# Patient Record
Sex: MALE | Race: WHITE
[De-identification: names, ages, dates, MRNs, and addresses within clinical notes are randomized per-mention and may not be internally consistent; named-entity substitution may affect disease eponyms.]

---

## 2017-06-25 ENCOUNTER — RESULT REVIEW (OUTPATIENT)
Age: 57
End: 2017-06-25

## 2018-05-07 PROBLEM — Z00.00 ENCOUNTER FOR PREVENTIVE HEALTH EXAMINATION: Status: ACTIVE | Noted: 2018-05-07

## 2021-05-22 ENCOUNTER — APPOINTMENT (OUTPATIENT)
Dept: DISASTER EMERGENCY | Facility: CLINIC | Age: 61
End: 2021-05-22

## 2021-05-23 LAB — SARS-COV-2 N GENE NPH QL NAA+PROBE: NOT DETECTED

## 2021-07-10 ENCOUNTER — APPOINTMENT (OUTPATIENT)
Dept: CT IMAGING | Facility: CLINIC | Age: 61
End: 2021-07-10
Payer: COMMERCIAL

## 2021-07-10 ENCOUNTER — OUTPATIENT (OUTPATIENT)
Dept: OUTPATIENT SERVICES | Facility: HOSPITAL | Age: 61
LOS: 1 days | End: 2021-07-10
Payer: COMMERCIAL

## 2021-07-10 DIAGNOSIS — R10.9 UNSPECIFIED ABDOMINAL PAIN: ICD-10-CM

## 2021-07-10 PROCEDURE — 82565 ASSAY OF CREATININE: CPT

## 2021-07-10 PROCEDURE — 74177 CT ABD & PELVIS W/CONTRAST: CPT | Mod: 26

## 2021-07-10 PROCEDURE — 74177 CT ABD & PELVIS W/CONTRAST: CPT

## 2021-07-12 ENCOUNTER — APPOINTMENT (OUTPATIENT)
Dept: CT IMAGING | Facility: CLINIC | Age: 61
End: 2021-07-12

## 2022-07-29 ENCOUNTER — APPOINTMENT (OUTPATIENT)
Dept: ORTHOPEDIC SURGERY | Facility: CLINIC | Age: 62
End: 2022-07-29

## 2022-07-29 PROCEDURE — 99204 OFFICE O/P NEW MOD 45 MIN: CPT

## 2022-07-29 PROCEDURE — 73030 X-RAY EXAM OF SHOULDER: CPT | Mod: LT

## 2022-07-30 NOTE — HISTORY OF PRESENT ILLNESS
[de-identified] : LUIS FELIPE GIPSONBOSTON is a 62 year male being seen for initial visit L shoulder pain.

## 2022-07-30 NOTE — DISCUSSION/SUMMARY
[de-identified] : Narendra is a 62-year-old male whose had chronic left shoulder pain x1 year.  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  He has done conservative measures multiple cortisone injections.  He has subsequent proximal biceps rupture roughly 1 and half weeks ago.  We discussed operative versus nonoperative management he does have a full-thickness tear of his rotator cuff confirmed on MRI.  He is compensating moderately well.  He still has significant pain in his shoulder.  After thorough discussion of alternatives of care as well as advantages of edges complications patient would like to move forward with surgical invention.  I explained to him that a biceps tenodesis at this time may not improve his function significantly although he would like to consider attempting to try to fix it.  We also corrina rotator cuff repair which she is willing to move forward with.  All risks, benefits and alternatives to the proposed surgical procedure, left shoulder arthroscopy with rotator cuff repair, subacromial decompression, extensive debridement, open biceps tenodesis, were discussed in great detail with the patient. Risks include but are not limited to pain, bleeding, infection, stiffness, [default value], medical complications (including DVT, PE, MI), and risks of anesthesia. The patient expressed understanding and all questions were answered. The patient is electing to proceed, and will have the patient scheduled accordingly.

## 2022-07-30 NOTE — REASON FOR VISIT
Detail Level: Simple [Initial Visit] : an initial visit for [FreeTextEntry2] : L shoulder pain. Detail Level: Zone

## 2022-07-30 NOTE — PHYSICAL EXAM
[de-identified] : Physical Exam:\par General: Well appearing, no acute distress, A&O\par Neurologic: A&Ox3, No focal deficits\par Head: NCAT without abrasions, lacerations, or ecchymosis to head, face, or scalp\par Eyes: No scleral icterus, no gross abnormalities\par Respiratory: Equal chest wall expansion bilaterally, no accessory muscle use\par Lymphatic: No lymphadenopathy palpated\par Skin: Warm a\par Psychiatric: Normal mood and affect\par Left shoulder exam\par \par ·	Inspection: No malalignment, No defects\par ·	Skin: No masses, No lesions\par ·	Neck: Negative Spurlings, full ROM without pain\par ·	ROM: RIGHT Active FF to 180 abduction to 150 ER to 55, IR to L4, LEFT Active FF to 110, abduction to 90, ER to 15, IR to back pocket\par ·	Painful arc ROM: None\par ·	Tenderness: No bicipital tenderness, no tenderness to the greater tuberosity/RTC insertion, no anterior shoulder/lesser tuberosity tenderness\par ·	Strength: 5/5 ER, 5/5 IR in adduction, 5/5 supraspinatus testing, positive O'Briens test, deformity noted at biceps with Corwin sign\par ·	AC Joint: No ttp, no pain with cross arm testing\par ·	Biceps: Speed positive, Yergusons shoulder\par ·	Impingement test: Negative Sheehan, Negative Neer \par ·	Stability: Negative apprehension, negative anterior/posterior load and shift\par ·	Vasc: 2+ radial pulse\par ·	Neuro: AIN, PIN, Ulnar nerve in tact to motor\par ·	Sensation: In tact to light touch throughout\par \par  [de-identified] : 4 views of L shoulder were performed today and available for me to review. Results were discussed with the patient. They demonstrate no f/x, dislocation or other deformity.\par

## 2022-08-05 ENCOUNTER — OUTPATIENT (OUTPATIENT)
Dept: OUTPATIENT SERVICES | Facility: HOSPITAL | Age: 62
LOS: 1 days | End: 2022-08-05
Payer: COMMERCIAL

## 2022-08-05 VITALS
TEMPERATURE: 98 F | HEIGHT: 69.5 IN | RESPIRATION RATE: 16 BRPM | HEART RATE: 51 BPM | SYSTOLIC BLOOD PRESSURE: 111 MMHG | WEIGHT: 151.9 LBS | OXYGEN SATURATION: 100 % | DIASTOLIC BLOOD PRESSURE: 74 MMHG

## 2022-08-05 DIAGNOSIS — Z98.890 OTHER SPECIFIED POSTPROCEDURAL STATES: Chronic | ICD-10-CM

## 2022-08-05 DIAGNOSIS — Z01.818 ENCOUNTER FOR OTHER PREPROCEDURAL EXAMINATION: ICD-10-CM

## 2022-08-05 DIAGNOSIS — S46.219A STRAIN OF MUSCLE, FASCIA AND TENDON OF OTHER PARTS OF BICEPS, UNSPECIFIED ARM, INITIAL ENCOUNTER: ICD-10-CM

## 2022-08-05 LAB
ANION GAP SERPL CALC-SCNC: 4 MMOL/L — LOW (ref 5–17)
APTT BLD: 31.9 SEC — SIGNIFICANT CHANGE UP (ref 27.5–35.5)
BASOPHILS # BLD AUTO: 0.01 K/UL — SIGNIFICANT CHANGE UP (ref 0–0.2)
BASOPHILS NFR BLD AUTO: 0.2 % — SIGNIFICANT CHANGE UP (ref 0–2)
BUN SERPL-MCNC: 19 MG/DL — SIGNIFICANT CHANGE UP (ref 7–23)
CALCIUM SERPL-MCNC: 8.7 MG/DL — SIGNIFICANT CHANGE UP (ref 8.5–10.1)
CHLORIDE SERPL-SCNC: 108 MMOL/L — SIGNIFICANT CHANGE UP (ref 96–108)
CO2 SERPL-SCNC: 30 MMOL/L — SIGNIFICANT CHANGE UP (ref 22–31)
CREAT SERPL-MCNC: 0.87 MG/DL — SIGNIFICANT CHANGE UP (ref 0.5–1.3)
EGFR: 96 ML/MIN/1.73M2 — SIGNIFICANT CHANGE UP
EOSINOPHIL # BLD AUTO: 0.05 K/UL — SIGNIFICANT CHANGE UP (ref 0–0.5)
EOSINOPHIL NFR BLD AUTO: 1 % — SIGNIFICANT CHANGE UP (ref 0–6)
GLUCOSE SERPL-MCNC: 114 MG/DL — HIGH (ref 70–99)
HCT VFR BLD CALC: 44.3 % — SIGNIFICANT CHANGE UP (ref 39–50)
HGB BLD-MCNC: 15.2 G/DL — SIGNIFICANT CHANGE UP (ref 13–17)
IMM GRANULOCYTES NFR BLD AUTO: 0.2 % — SIGNIFICANT CHANGE UP (ref 0–1.5)
INR BLD: 1.1 RATIO — SIGNIFICANT CHANGE UP (ref 0.88–1.16)
LYMPHOCYTES # BLD AUTO: 1.41 K/UL — SIGNIFICANT CHANGE UP (ref 1–3.3)
LYMPHOCYTES # BLD AUTO: 28.3 % — SIGNIFICANT CHANGE UP (ref 13–44)
MCHC RBC-ENTMCNC: 31.8 PG — SIGNIFICANT CHANGE UP (ref 27–34)
MCHC RBC-ENTMCNC: 34.3 GM/DL — SIGNIFICANT CHANGE UP (ref 32–36)
MCV RBC AUTO: 92.7 FL — SIGNIFICANT CHANGE UP (ref 80–100)
MONOCYTES # BLD AUTO: 0.41 K/UL — SIGNIFICANT CHANGE UP (ref 0–0.9)
MONOCYTES NFR BLD AUTO: 8.2 % — SIGNIFICANT CHANGE UP (ref 2–14)
NEUTROPHILS # BLD AUTO: 3.1 K/UL — SIGNIFICANT CHANGE UP (ref 1.8–7.4)
NEUTROPHILS NFR BLD AUTO: 62.1 % — SIGNIFICANT CHANGE UP (ref 43–77)
PLATELET # BLD AUTO: 182 K/UL — SIGNIFICANT CHANGE UP (ref 150–400)
POTASSIUM SERPL-MCNC: 3.8 MMOL/L — SIGNIFICANT CHANGE UP (ref 3.5–5.3)
POTASSIUM SERPL-SCNC: 3.8 MMOL/L — SIGNIFICANT CHANGE UP (ref 3.5–5.3)
PROTHROM AB SERPL-ACNC: 12.8 SEC — SIGNIFICANT CHANGE UP (ref 10.5–13.4)
RBC # BLD: 4.78 M/UL — SIGNIFICANT CHANGE UP (ref 4.2–5.8)
RBC # FLD: 12 % — SIGNIFICANT CHANGE UP (ref 10.3–14.5)
SODIUM SERPL-SCNC: 142 MMOL/L — SIGNIFICANT CHANGE UP (ref 135–145)
WBC # BLD: 4.99 K/UL — SIGNIFICANT CHANGE UP (ref 3.8–10.5)
WBC # FLD AUTO: 4.99 K/UL — SIGNIFICANT CHANGE UP (ref 3.8–10.5)

## 2022-08-05 PROCEDURE — 85730 THROMBOPLASTIN TIME PARTIAL: CPT

## 2022-08-05 PROCEDURE — 85025 COMPLETE CBC W/AUTO DIFF WBC: CPT

## 2022-08-05 PROCEDURE — 36415 COLL VENOUS BLD VENIPUNCTURE: CPT

## 2022-08-05 PROCEDURE — 93010 ELECTROCARDIOGRAM REPORT: CPT

## 2022-08-05 PROCEDURE — 80048 BASIC METABOLIC PNL TOTAL CA: CPT

## 2022-08-05 PROCEDURE — 93005 ELECTROCARDIOGRAM TRACING: CPT

## 2022-08-05 PROCEDURE — 99214 OFFICE O/P EST MOD 30 MIN: CPT | Mod: 25

## 2022-08-05 PROCEDURE — 85610 PROTHROMBIN TIME: CPT

## 2022-08-05 NOTE — H&P PST ADULT - NSICDXPASTMEDICALHX_GEN_ALL_CORE_FT
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

## 2022-08-05 NOTE — H&P PST ADULT - ASSESSMENT
62 years old male present to PST prior to left shoulder arthroscopy rotator cuff repair, open bicep tenodesis, subacromial compression with Dr Son.    Plan   1. NPO as per ASU  2. Covid swab scheduled  3. Use E-Z sponge as directed  4. Drink a quart of extra  fluids the day before your surgery.  5. Medical optimization for surgery with Dr. Gage  6. CBC, BMP, PT/ INR and PTT sent to lab  7. EKG  done

## 2022-08-05 NOTE — H&P PST ADULT - HISTORY OF PRESENT ILLNESS
62 years old male with traumatic tear of left rotator cuff, tear of left bicep. Reports constant aching pain to left shoulder radiating to distal forearm. Sharp pain with lifting. Numbness and tingling to distal arm at times. Pain started "last fall". Seen by Dr. Son two weeks ago. MRI done by previous orthopedist. Planned left shoulder arthroscopy.

## 2022-08-05 NOTE — H&P PST ADULT - NSICDXFAMILYHX_GEN_ALL_CORE_FT
FAMILY HISTORY:  Mother  Still living? No  Family history of arthritis, Age at diagnosis: Age Unknown  Family history of depression, Age at diagnosis: Age Unknown

## 2022-08-05 NOTE — H&P PST ADULT - ATTENDING COMMENTS
Orthopedic Sports Attending:    Agree with above resident/PA note.  Note edited where necessary.      Patient seen and examined at bedside. Discussed the risks, complications, benefits and alternatives of care. Confirmed procedure and site. Patient fully understands and would like to proceed with surgery.    Celestino Son, DO  Orthopaedic Surgery

## 2022-08-05 NOTE — H&P PST ADULT - NSICDXPASTSURGICALHX_GEN_ALL_CORE_FT
PAST SURGICAL HISTORY:  History of incision and drainage     History of intestinal surgery as a child due to Hirshsprung's    History of shoulder surgery right tendon repair 2011

## 2022-08-06 DIAGNOSIS — S46.219A STRAIN OF MUSCLE, FASCIA AND TENDON OF OTHER PARTS OF BICEPS, UNSPECIFIED ARM, INITIAL ENCOUNTER: ICD-10-CM

## 2022-08-06 DIAGNOSIS — Z01.818 ENCOUNTER FOR OTHER PREPROCEDURAL EXAMINATION: ICD-10-CM

## 2022-08-08 PROBLEM — Q43.1 HIRSCHSPRUNG'S DISEASE: Chronic | Status: ACTIVE | Noted: 2022-08-05

## 2022-08-08 PROBLEM — H26.9 UNSPECIFIED CATARACT: Chronic | Status: ACTIVE | Noted: 2022-08-05

## 2022-08-08 PROBLEM — S46.219A STRAIN OF MUSCLE, FASCIA AND TENDON OF OTHER PARTS OF BICEPS, UNSPECIFIED ARM, INITIAL ENCOUNTER: Chronic | Status: ACTIVE | Noted: 2022-08-05

## 2022-08-08 PROBLEM — M51.26 OTHER INTERVERTEBRAL DISC DISPLACEMENT, LUMBAR REGION: Chronic | Status: ACTIVE | Noted: 2022-08-05

## 2022-08-08 PROBLEM — R00.2 PALPITATIONS: Chronic | Status: ACTIVE | Noted: 2022-08-05

## 2022-08-08 PROBLEM — S46.012A STRAIN OF MUSCLE(S) AND TENDON(S) OF THE ROTATOR CUFF OF LEFT SHOULDER, INITIAL ENCOUNTER: Chronic | Status: ACTIVE | Noted: 2022-08-05

## 2022-08-08 PROBLEM — M50.20 OTHER CERVICAL DISC DISPLACEMENT, UNSPECIFIED CERVICAL REGION: Chronic | Status: ACTIVE | Noted: 2022-08-05

## 2022-08-10 ENCOUNTER — OUTPATIENT (OUTPATIENT)
Dept: INPATIENT UNIT | Facility: HOSPITAL | Age: 62
LOS: 1 days | Discharge: HOME | End: 2022-08-10
Payer: COMMERCIAL

## 2022-08-10 ENCOUNTER — TRANSCRIPTION ENCOUNTER (OUTPATIENT)
Age: 62
End: 2022-08-10

## 2022-08-10 ENCOUNTER — APPOINTMENT (OUTPATIENT)
Dept: ORTHOPEDIC SURGERY | Facility: HOSPITAL | Age: 62
End: 2022-08-10

## 2022-08-10 ENCOUNTER — RESULT REVIEW (OUTPATIENT)
Age: 62
End: 2022-08-10

## 2022-08-10 VITALS
OXYGEN SATURATION: 100 % | RESPIRATION RATE: 16 BRPM | TEMPERATURE: 98 F | HEART RATE: 48 BPM | SYSTOLIC BLOOD PRESSURE: 125 MMHG | DIASTOLIC BLOOD PRESSURE: 76 MMHG

## 2022-08-10 VITALS
TEMPERATURE: 98 F | RESPIRATION RATE: 15 BRPM | DIASTOLIC BLOOD PRESSURE: 76 MMHG | SYSTOLIC BLOOD PRESSURE: 109 MMHG | HEIGHT: 69 IN | OXYGEN SATURATION: 99 % | HEART RATE: 47 BPM | WEIGHT: 151.9 LBS

## 2022-08-10 DIAGNOSIS — Z98.890 OTHER SPECIFIED POSTPROCEDURAL STATES: Chronic | ICD-10-CM

## 2022-08-10 DIAGNOSIS — S46.219A STRAIN OF MUSCLE, FASCIA AND TENDON OF OTHER PARTS OF BICEPS, UNSPECIFIED ARM, INITIAL ENCOUNTER: ICD-10-CM

## 2022-08-10 DIAGNOSIS — S46.012A STRAIN OF MUSCLE(S) AND TENDON(S) OF THE ROTATOR CUFF OF LEFT SHOULDER, INITIAL ENCOUNTER: ICD-10-CM

## 2022-08-10 PROCEDURE — 29827 SHO ARTHRS SRG RT8TR CUF RPR: CPT | Mod: LT,22

## 2022-08-10 PROCEDURE — 23430 REPAIR BICEPS TENDON: CPT | Mod: LT

## 2022-08-10 PROCEDURE — 29826 SHO ARTHRS SRG DECOMPRESSION: CPT | Mod: LT

## 2022-08-10 PROCEDURE — C1713: CPT

## 2022-08-10 PROCEDURE — 88304 TISSUE EXAM BY PATHOLOGIST: CPT

## 2022-08-10 PROCEDURE — C9399: CPT

## 2022-08-10 PROCEDURE — 88304 TISSUE EXAM BY PATHOLOGIST: CPT | Mod: 26

## 2022-08-10 RX ORDER — ONDANSETRON 8 MG/1
4 TABLET, FILM COATED ORAL ONCE
Refills: 0 | Status: DISCONTINUED | OUTPATIENT
Start: 2022-08-10 | End: 2022-08-10

## 2022-08-10 RX ORDER — IBUPROFEN 200 MG
1 TABLET ORAL
Qty: 0 | Refills: 0 | DISCHARGE

## 2022-08-10 RX ORDER — OXYCODONE HYDROCHLORIDE 5 MG/1
5 TABLET ORAL ONCE
Refills: 0 | Status: DISCONTINUED | OUTPATIENT
Start: 2022-08-10 | End: 2022-08-10

## 2022-08-10 RX ORDER — OXYCODONE HYDROCHLORIDE 5 MG/1
1 TABLET ORAL
Qty: 20 | Refills: 0
Start: 2022-08-10 | End: 2022-08-14

## 2022-08-10 RX ORDER — SODIUM CHLORIDE 9 MG/ML
1000 INJECTION, SOLUTION INTRAVENOUS
Refills: 0 | Status: DISCONTINUED | OUTPATIENT
Start: 2022-08-10 | End: 2022-08-10

## 2022-08-10 RX ORDER — FENTANYL CITRATE 50 UG/ML
50 INJECTION INTRAVENOUS
Refills: 0 | Status: DISCONTINUED | OUTPATIENT
Start: 2022-08-10 | End: 2022-08-10

## 2022-08-10 RX ORDER — ONDANSETRON 8 MG/1
1 TABLET, FILM COATED ORAL
Qty: 15 | Refills: 0
Start: 2022-08-10 | End: 2022-08-14

## 2022-08-10 RX ORDER — HYDROMORPHONE HYDROCHLORIDE 2 MG/ML
0.5 INJECTION INTRAMUSCULAR; INTRAVENOUS; SUBCUTANEOUS
Refills: 0 | Status: DISCONTINUED | OUTPATIENT
Start: 2022-08-10 | End: 2022-08-10

## 2022-08-10 RX ORDER — DOCUSATE SODIUM 100 MG
1 CAPSULE ORAL
Qty: 28 | Refills: 0
Start: 2022-08-10 | End: 2022-08-23

## 2022-08-10 RX ORDER — OXYCODONE HYDROCHLORIDE 5 MG/1
10 TABLET ORAL ONCE
Refills: 0 | Status: DISCONTINUED | OUTPATIENT
Start: 2022-08-10 | End: 2022-08-10

## 2022-08-10 RX ADMIN — OXYCODONE HYDROCHLORIDE 10 MILLIGRAM(S): 5 TABLET ORAL at 15:37

## 2022-08-10 RX ADMIN — FENTANYL CITRATE 50 MICROGRAM(S): 50 INJECTION INTRAVENOUS at 15:54

## 2022-08-10 RX ADMIN — HYDROMORPHONE HYDROCHLORIDE 0.5 MILLIGRAM(S): 2 INJECTION INTRAMUSCULAR; INTRAVENOUS; SUBCUTANEOUS at 15:50

## 2022-08-10 RX ADMIN — FENTANYL CITRATE 50 MICROGRAM(S): 50 INJECTION INTRAVENOUS at 15:36

## 2022-08-10 RX ADMIN — HYDROMORPHONE HYDROCHLORIDE 0.5 MILLIGRAM(S): 2 INJECTION INTRAMUSCULAR; INTRAVENOUS; SUBCUTANEOUS at 15:54

## 2022-08-10 RX ADMIN — SODIUM CHLORIDE 75 MILLILITER(S): 9 INJECTION, SOLUTION INTRAVENOUS at 15:54

## 2022-08-10 RX ADMIN — OXYCODONE HYDROCHLORIDE 10 MILLIGRAM(S): 5 TABLET ORAL at 15:53

## 2022-08-10 NOTE — ASU DISCHARGE PLAN (ADULT/PEDIATRIC) - CARE PROVIDER_API CALL
Celestino Son (DO)  [address], [address]  [place] [address]  Phone: [phone]  Fax: [phone]  Follow Up Time:

## 2022-08-10 NOTE — ASU DISCHARGE PLAN (ADULT/PEDIATRIC) - NS MD DC FALL RISK RISK
For information on Fall & Injury Prevention, visit: [email or web address] OR  [email or web address] OR  https://www.cdc.gov/steadi/patient.html

## 2022-08-10 NOTE — ASU PATIENT PROFILE, ADULT - FALL HARM RISK - UNIVERSAL INTERVENTIONS
Bed in lowest position, wheels locked, appropriate side rails in place/Call bell, personal items and telephone in reach/Instruct patient to call for assistance before getting out of bed or chair/Non-slip footwear when patient is out of bed/[place] to call system/Physically safe environment - no spills, clutter or unnecessary equipment/Purposeful Proactive Rounding/Room/bathroom lighting operational, light cord in reach

## 2022-08-10 NOTE — BRIEF OPERATIVE NOTE - OPERATION/FINDINGS
left shoulder arthroscopic rotator cuff repair, subscapularis repair, subacromial decompression, open subpectoral biceps tenodesis, extensive arthroscopic debridement

## 2022-08-12 DIAGNOSIS — S46.012A STRAIN OF MUSCLE(S) AND TENDON(S) OF THE ROTATOR CUFF OF LEFT SHOULDER, INITIAL ENCOUNTER: ICD-10-CM
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
## 2022-08-12 DIAGNOSIS — S46.112A STRAIN OF MUSCLE, FASCIA AND TENDON OF LONG HEAD OF BICEPS, LEFT ARM, INITIAL ENCOUNTER: ICD-10-CM

## 2022-08-23 ENCOUNTER — APPOINTMENT (OUTPATIENT)
Dept: ORTHOPEDIC SURGERY | Facility: CLINIC | Age: 62
End: 2022-08-23
Payer: COMMERCIAL

## 2022-08-23 PROCEDURE — 73030 X-RAY EXAM OF SHOULDER: CPT | Mod: LT

## 2022-08-23 PROCEDURE — 99024 POSTOP FOLLOW-UP VISIT: CPT

## 2022-08-24 NOTE — ADDENDUM
[FreeTextEntry1] : Documented by Yina Charles acting as a scribe for Dr. Son and Jeff Choi PA-C on 08/23/2022. \par \par All medical record entries made by the Scribe were at my, Dr. Son, and Jeff Choi's, direction and\par personally dictated by me on 08/23/2022. I have reviewed the chart and agree that the record\par accurately reflects my personal performance of the history, physical exam, procedure and imaging.

## 2022-08-24 NOTE — HISTORY OF PRESENT ILLNESS
[___ Days Post Op] : post op day #[unfilled] [Doing Well] : is doing well [Excellent Pain Control] : has excellent pain control [No Sign of Infection] : is showing no signs of infection [2] : the patient reports pain that is 2/10 in severity [de-identified] : Procedure: Left shoulder arthroscopic rotator cuff repair, subscapularis repair, subacromial decompression, open subpectoral biceps \par tenodesis, extensive arthroscopic debridement \par DOS: 08/10/2022 [de-identified] : Patient presents 13 days status post left shoulder arthroscopic rotator cuff repair, subscapularis repair, subacromial decompression, open subpectoral biceps \par tenodesis, extensive arthroscopic debridement. He denies fever or chills, redness around or near the incision site(s), numbness/tingling. He denies nausea/ vomiting and admits to their appetite since their surgery being back to normal. Normal bowel habits at this time. Patient has not yet started physical therapy. Patient presents today in protective sling with pillow. Patient since their surgery has utilized tylenol as their primary pain control with relief in their symptoms. He no longer requires narcotics for pain control. He complains of occasional difficulty sleeping secondary to the pain. However, he reports symptoms have been improving since the surgery.  [de-identified] : Incisions are clean, dry and intact. No surrounding erythema. No drainage. Wound appears to be healing well. ROM not tested due to nature of surgery. Motor and sensation are intact distally. He has full range of motion of all fingers with capillary refill of <2 seconds throughout. He has good nerve function and median nerve, ulnar, radial, PIN, AIN. He has no sensory deficits over the C5-T1 nerve roots. Radial pulses 2+. Able to make an A-OK sign and thumbs up sign: able to flex/extend thumb, able to cross middle over index finger. \par \par Full ROM elbow, wrist, hand  [de-identified] : 2 view xrays of pt Left (L) shoulder were performed today and available for me to review. They demonstrate adequate position of suture buttons to bone. No fracture. No dislocation. No other deformities. Humeral head not high riding, adequate position of humeral head in glenoid.  [de-identified] : Patient was given prescription of formal physical therapy that he will perform 2x/wk for 6-8 wks. He was advised to continue nonweightbearing with the left UE. Patient will follow up in 4 wks for repeat clinical assessment. All questions were answered and the patient verbalized understanding. The patient is in agreement with this treatment plan.

## 2022-09-21 ENCOUNTER — APPOINTMENT (OUTPATIENT)
Dept: ORTHOPEDIC SURGERY | Facility: CLINIC | Age: 62
End: 2022-09-21

## 2022-09-21 PROCEDURE — 99024 POSTOP FOLLOW-UP VISIT: CPT

## 2022-09-21 NOTE — HISTORY OF PRESENT ILLNESS
[___ Weeks Post Op] : [unfilled] weeks post op [1] : the patient reports pain that is 1/10 in severity [Clean/Dry/Intact] : clean, dry and intact [Neuro Intact] : an unremarkable neurological exam [Vascular Intact] : ~T peripheral vascular exam normal [Doing Well] : is doing well [Excellent Pain Control] : has excellent pain control [No Sign of Infection] : is showing no signs of infection [de-identified] : Procedure: Left shoulder arthroscopic rotator cuff repair, subscapularis repair, subacromial decompression, open subpectoral biceps \par tenodesis, extensive arthroscopic debridement \par DOS: 08/10/2022 [de-identified] : Patient presents 6 weeks status post left shoulder arthroscopic rotator cuff repair, subscapularis repair, subacromial decompression, open subpectoral biceps \par tenodesis, extensive arthroscopic debridement, 6 weeks ago. Patient presents today out of sling. He reports he is feeling well, and his sleep has been improving. He states he has been doing pendulums.  He attended 1 session of physical therapy and has a follow-up appointment next week.  [de-identified] : Incisions are clean, dry and intact. No surrounding erythema. No drainage. Wound appears to be healing well.  The patient has active forward flexion to 125 degrees external rotation to 65 degrees and internal rotation to a mid lumbar level.  He has full elbow wrist and hand motion and 2+ capillary refill.  Sensations intact distally. [de-identified] : No new imaging performed today. [de-identified] : Patient was given prescription of formal physical therapy that he will perform 2x/wk for 6-8 wks. He was advised to form active and passive range of motion.  He will avoid strengthening.  We discussed the natural history and biology behind rotator cuff repairs as well as the potential for failure requiring revision.  He will follow-up in 6 weeks.  All questions were answered.

## 2022-11-02 ENCOUNTER — APPOINTMENT (OUTPATIENT)
Dept: ORTHOPEDIC SURGERY | Facility: CLINIC | Age: 62
End: 2022-11-02

## 2022-11-02 PROCEDURE — 99024 POSTOP FOLLOW-UP VISIT: CPT

## 2022-11-02 NOTE — HISTORY OF PRESENT ILLNESS
[___ Months Post Op] : [unfilled] months post op [1] : the patient reports pain that is 1/10 in severity [Clean/Dry/Intact] : clean, dry and intact [Neuro Intact] : an unremarkable neurological exam [Vascular Intact] : ~T peripheral vascular exam normal [Doing Well] : is doing well [Excellent Pain Control] : has excellent pain control [No Sign of Infection] : is showing no signs of infection [de-identified] : Procedure: Left shoulder arthroscopic rotator cuff repair, subscapularis repair, subacromial decompression, open subpectoral biceps \par tenodesis, extensive arthroscopic debridement \par DOS: 08/10/2022 [de-identified] : Patient presents 12 weeks status post left shoulder arthroscopic rotator cuff repair, subscapularis repair, subacromial decompression, open subpectoral biceps \par tenodesis, extensive arthroscopic debridement,  weeks ago. Patient presents today out of sling. He reports he is feeling well, and his sleep has been improving. He states he has been doing pendulums.  He is no longer attending PT as he felt it his neck and lower back. [de-identified] : Incisions are clean, dry and intact. No surrounding erythema. No drainage. Wound appears to be healing well.  The patient has active forward flexion to 165 degrees external rotation to 65 degrees and internal rotation to a mid lumbar level.  He has full elbow wrist and hand motion and 2+ capillary refill.  Sensations intact distally. [de-identified] : No new imaging performed today. [de-identified] : Patient was given prescription of formal physical therapy.  He can initiate the strengthening phase.  I cautioned him against doing too much too soon.  He can apply ice and moist heat.  He is unsure if he will return to physical therapy.  I instructed him on a home program including slow progression of strengthening.  He will follow-up in 2 to 3 months.  All questions were answered.

## 2023-01-09 ENCOUNTER — APPOINTMENT (OUTPATIENT)
Dept: ORTHOPEDIC SURGERY | Facility: CLINIC | Age: 63
End: 2023-01-09

## 2023-03-09 ENCOUNTER — NON-APPOINTMENT (OUTPATIENT)
Age: 63
End: 2023-03-09

## 2023-03-09 ENCOUNTER — APPOINTMENT (OUTPATIENT)
Dept: ELECTROPHYSIOLOGY | Facility: CLINIC | Age: 63
End: 2023-03-09
Payer: COMMERCIAL

## 2023-03-09 VITALS
HEART RATE: 55 BPM | OXYGEN SATURATION: 96 % | WEIGHT: 161 LBS | TEMPERATURE: 99.1 F | DIASTOLIC BLOOD PRESSURE: 68 MMHG | SYSTOLIC BLOOD PRESSURE: 116 MMHG

## 2023-03-09 DIAGNOSIS — Z78.9 OTHER SPECIFIED HEALTH STATUS: ICD-10-CM

## 2023-03-09 DIAGNOSIS — Z82.49 FAMILY HISTORY OF ISCHEMIC HEART DISEASE AND OTHER DISEASES OF THE CIRCULATORY SYSTEM: ICD-10-CM

## 2023-03-09 DIAGNOSIS — Q43.1 HIRSCHSPRUNG'S DISEASE: ICD-10-CM

## 2023-03-09 DIAGNOSIS — S46.219A STRAIN OF MUSCLE, FASCIA AND TENDON OF OTHER PARTS OF BICEPS, UNSPECIFIED ARM, INITIAL ENCOUNTER: ICD-10-CM

## 2023-03-09 DIAGNOSIS — S46.012A STRAIN OF MUSCLE(S) AND TENDON(S) OF THE ROTATOR CUFF OF LEFT SHOULDER, INITIAL ENCOUNTER: ICD-10-CM

## 2023-03-09 DIAGNOSIS — Z01.818 ENCOUNTER FOR OTHER PREPROCEDURAL EXAMINATION: ICD-10-CM

## 2023-03-09 PROCEDURE — 99204 OFFICE O/P NEW MOD 45 MIN: CPT | Mod: 25

## 2023-03-09 PROCEDURE — 93000 ELECTROCARDIOGRAM COMPLETE: CPT

## 2023-03-09 RX ORDER — RIVAROXABAN 20 MG/1
20 TABLET, FILM COATED ORAL
Refills: 0 | Status: ACTIVE | COMMUNITY

## 2023-03-09 NOTE — HISTORY OF PRESENT ILLNESS
[FreeTextEntry1] : 62 year old gentleman presenting for evaluation of paroxysmal atrial fibrillation.  He has had episodes of palpitaiton for the last few years, that feel irregular and sometimes rapid. He recently had  an episodes that lasted more than 1 day. During workup for shoulder surgery  last year he was noted to have frequent ectopy.  \par \par  A recent event monitor 12/21/22- 1/4/23 revealed 9% AF burden with episodes of rapid rates up to 130s, and longest AF episodes lasting 8 hours (some episodes c.w atypical flutter), and two episodes of NSVT up to 10 beats.  \par \par He has otherwise had sinus bradycardia, which has limited medical therapy.  \par \par He has been started on Xarelto, which he is tolerating. \par \par He did see Dr Deng and ablation was recommended, but he is seeking a second opinion. He discussed with his daughter who is an NP and initially recommended he go to [facility], but he prefers to stay local.  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 He denies limitations with exertion.  \par \par  He does have 1-2 alcoholic drinks about 4x per week.  \par \par He denies history of CAD, CHF, syncope, or dyspnea. He does not snore, but does occasionally wake up sweating.  \par \par ECG today reveals sinus rhythm 46 bpm, with early repolarization pattern in the precordial leads, LA enlargement, and possible anteroseptal MI. Recent stress test and TTE have been unremarkable.

## 2023-03-09 NOTE — PHYSICAL EXAM
[Well Developed] : well developed [Well Nourished] : well nourished [No Acute Distress] : no acute distress [Normal Conjunctiva] : normal conjunctiva [Normal Venous Pressure] : normal venous pressure [No Murmur] : no murmur [No Rub] : no rub [Clear Lung Fields] : clear lung fields [Good Air Entry] : good air entry [No Respiratory Distress] : no respiratory distress  [Soft] : abdomen soft [Normal Gait] : normal gait [No Edema] : no edema [No Cyanosis] : no cyanosis [No Clubbing] : no clubbing [No Rash] : no rash [Moves all extremities] : moves all extremities [No Focal Deficits] : no focal deficits [Normal Speech] : normal speech [Alert and Oriented] : alert and oriented [de-identified] : regular bradycardia

## 2023-03-09 NOTE — CARDIOLOGY SUMMARY
[de-identified] : 3/9/23 sinus bradycardia 46 bpm, with early repolarization pattern in the precordial leads, LA enlargement, and possible anteroseptal MI. [de-identified] : Stress 1/2023 normal perfusion, no ischemia, occasional PACs and sinus bradycardia  [de-identified] :  \par \par TTE 12/2022 LVEF 60%,

## 2023-03-09 NOTE — DISCUSSION/SUMMARY
[EKG obtained to assist in diagnosis and management of assessed problem(s)] : EKG obtained to assist in diagnosis and management of assessed problem(s) [FreeTextEntry1] :  62 year old gentleman presenting for evaluation of paroxysmal atrial fibrillation. He has had symptomatic paroxysmal AF, with high burden of pAF and atrial flutter on recent event monitor (9%, episodes over 8hours). His symptoms are currently mild-moderate, but he has noted bother and long lasting episodes in the past. He has baseline sinus bradycardia, with HR 46 bpm today at rest, and is unlikely to tolerate medical therapy well. We did discuss AF in detail, associated risks and morbidities, and management options. I do agree that AF ablation is a good option for him to prevent further arrhythmia progression, and treat current symptoms. We discussed the risks nad benefits of AF ablation in detail, including procedure related risks such as bleeding, vascular injury, cardiac perforation, stroke and esophageal injury. He expressed understanding, and does want to proceed with ablation. I did explain that he would be in good hands with Dr Deng if he chooses to proceed with ablation with him. He is interested in planning ablation around his work schedule, which gets very busy in the spring.  \par \par -Ablation of paroxysmal atrial fibrillation and atrial flutter. T/c COSME pre ablation \par \par -continue Xarelto. Can take through day prior to procedure. Will reevaluate need for long-term anticoagulation in the future given his low thromboembolic risk profile. \par \par -episodes of NSVT noted on MCOT- would consider beta blocker but unable to take this due to bradycardia. Normal LV function noted with no ischemia on stress.  Will continue to monitor.

## 2023-03-09 NOTE — REVIEW OF SYSTEMS
[Palpitations] : palpitations [Under Stress] : under stress [Negative] : Heme/Lymph [SOB] : no shortness of breath [Dyspnea on exertion] : not dyspnea during exertion [Chest Discomfort] : no chest discomfort [Lower Ext Edema] : no extremity edema [Leg Claudication] : no intermittent leg claudication [Syncope] : no syncope [Joint Pain] : no joint pain [Myalgia] : no myalgia [Dizziness] : no dizziness [Convulsions] : no convulsions

## 2023-06-30 ENCOUNTER — OUTPATIENT (OUTPATIENT)
Dept: OUTPATIENT SERVICES | Facility: HOSPITAL | Age: 63
LOS: 1 days | End: 2023-06-30
Payer: MEDICARE

## 2023-06-30 VITALS
WEIGHT: 156.53 LBS | HEART RATE: 59 BPM | HEIGHT: 70 IN | SYSTOLIC BLOOD PRESSURE: 102 MMHG | RESPIRATION RATE: 18 BRPM | TEMPERATURE: 97 F | DIASTOLIC BLOOD PRESSURE: 78 MMHG | OXYGEN SATURATION: 96 %

## 2023-06-30 DIAGNOSIS — I48.0 PAROXYSMAL ATRIAL FIBRILLATION: ICD-10-CM

## 2023-06-30 DIAGNOSIS — Z98.890 OTHER SPECIFIED POSTPROCEDURAL STATES: Chronic | ICD-10-CM

## 2023-06-30 DIAGNOSIS — H26.9 UNSPECIFIED CATARACT: Chronic | ICD-10-CM

## 2023-06-30 DIAGNOSIS — Z01.818 ENCOUNTER FOR OTHER PREPROCEDURAL EXAMINATION: ICD-10-CM

## 2023-06-30 DIAGNOSIS — Z29.9 ENCOUNTER FOR PROPHYLACTIC MEASURES, UNSPECIFIED: ICD-10-CM

## 2023-06-30 LAB
ANION GAP SERPL CALC-SCNC: 10 MMOL/L — SIGNIFICANT CHANGE UP (ref 5–17)
APTT BLD: 33.7 SEC — SIGNIFICANT CHANGE UP (ref 27.5–35.5)
BASOPHILS # BLD AUTO: 0.02 K/UL — SIGNIFICANT CHANGE UP (ref 0–0.2)
BASOPHILS NFR BLD AUTO: 0.5 % — SIGNIFICANT CHANGE UP (ref 0–2)
BLD GP AB SCN SERPL QL: SIGNIFICANT CHANGE UP
BUN SERPL-MCNC: 19.4 MG/DL — SIGNIFICANT CHANGE UP (ref 8–20)
CALCIUM SERPL-MCNC: 8.5 MG/DL — SIGNIFICANT CHANGE UP (ref 8.4–10.5)
CHLORIDE SERPL-SCNC: 103 MMOL/L — SIGNIFICANT CHANGE UP (ref 96–108)
CO2 SERPL-SCNC: 25 MMOL/L — SIGNIFICANT CHANGE UP (ref 22–29)
CREAT SERPL-MCNC: 0.66 MG/DL — SIGNIFICANT CHANGE UP (ref 0.5–1.3)
EGFR: 104 ML/MIN/1.73M2 — SIGNIFICANT CHANGE UP
EOSINOPHIL # BLD AUTO: 0.05 K/UL — SIGNIFICANT CHANGE UP (ref 0–0.5)
EOSINOPHIL NFR BLD AUTO: 1.2 % — SIGNIFICANT CHANGE UP (ref 0–6)
GLUCOSE SERPL-MCNC: 116 MG/DL — HIGH (ref 70–99)
HCT VFR BLD CALC: 44.3 % — SIGNIFICANT CHANGE UP (ref 39–50)
HGB BLD-MCNC: 14.8 G/DL — SIGNIFICANT CHANGE UP (ref 13–17)
IMM GRANULOCYTES NFR BLD AUTO: 0.2 % — SIGNIFICANT CHANGE UP (ref 0–0.9)
INR BLD: 1.16 RATIO — SIGNIFICANT CHANGE UP (ref 0.88–1.16)
LYMPHOCYTES # BLD AUTO: 1.4 K/UL — SIGNIFICANT CHANGE UP (ref 1–3.3)
LYMPHOCYTES # BLD AUTO: 32.6 % — SIGNIFICANT CHANGE UP (ref 13–44)
MAGNESIUM SERPL-MCNC: 2.1 MG/DL — SIGNIFICANT CHANGE UP (ref 1.6–2.6)
MCHC RBC-ENTMCNC: 31.2 PG — SIGNIFICANT CHANGE UP (ref 27–34)
MCHC RBC-ENTMCNC: 33.4 GM/DL — SIGNIFICANT CHANGE UP (ref 32–36)
MCV RBC AUTO: 93.3 FL — SIGNIFICANT CHANGE UP (ref 80–100)
MONOCYTES # BLD AUTO: 0.35 K/UL — SIGNIFICANT CHANGE UP (ref 0–0.9)
MONOCYTES NFR BLD AUTO: 8.1 % — SIGNIFICANT CHANGE UP (ref 2–14)
NEUTROPHILS # BLD AUTO: 2.47 K/UL — SIGNIFICANT CHANGE UP (ref 1.8–7.4)
NEUTROPHILS NFR BLD AUTO: 57.4 % — SIGNIFICANT CHANGE UP (ref 43–77)
PLATELET # BLD AUTO: 216 K/UL — SIGNIFICANT CHANGE UP (ref 150–400)
POTASSIUM SERPL-MCNC: 4.4 MMOL/L — SIGNIFICANT CHANGE UP (ref 3.5–5.3)
POTASSIUM SERPL-SCNC: 4.4 MMOL/L — SIGNIFICANT CHANGE UP (ref 3.5–5.3)
PROTHROM AB SERPL-ACNC: 13.5 SEC — HIGH (ref 10.5–13.4)
RBC # BLD: 4.75 M/UL — SIGNIFICANT CHANGE UP (ref 4.2–5.8)
RBC # FLD: 11.9 % — SIGNIFICANT CHANGE UP (ref 10.3–14.5)
SODIUM SERPL-SCNC: 138 MMOL/L — SIGNIFICANT CHANGE UP (ref 135–145)
WBC # BLD: 4.3 K/UL — SIGNIFICANT CHANGE UP (ref 3.8–10.5)
WBC # FLD AUTO: 4.3 K/UL — SIGNIFICANT CHANGE UP (ref 3.8–10.5)

## 2023-06-30 PROCEDURE — 93005 ELECTROCARDIOGRAM TRACING: CPT

## 2023-06-30 PROCEDURE — G0463: CPT

## 2023-06-30 PROCEDURE — 93010 ELECTROCARDIOGRAM REPORT: CPT

## 2023-06-30 NOTE — H&P PST ADULT - NSICDXPASTSURGICALHX_GEN_ALL_CORE_FT
PAST SURGICAL HISTORY:  History of incision and drainage     History of intestinal surgery as a child due to Hirshsprung's    History of shoulder surgery right tendon repair 2011     PAST SURGICAL HISTORY:  Cataract     History of incision and drainage     History of intestinal surgery as a child due to Hirshsprung's    History of shoulder surgery right tendon repair 2011

## 2023-06-30 NOTE — H&P PST ADULT - BSA (M2)
Ambulate 25ft with RW/HHA with min A in 1 week. Ascend/descend 3steps with B/L rails and CGA in 1 week.
1.88

## 2023-06-30 NOTE — H&P PST ADULT - HISTORY OF PRESENT ILLNESS
63y Male with history of _____________, and symptomatic **paroxysmal or persistent** atrial fibrillation. **add additional description as applicable**     Male now presents in anticipation of elective radiofrequency ablation of atrial fibrillation.       Echocardiogram (date):   Stress Test (date):  Cardiac CT or MRI (date):   Cardiac Cath (date):   Cardiac surgery (date):  63y Male with history of  symptomatic paroxysmal  atrial fibrillation. Pt states he has episodes of  palpitations over the past few years feels rapid and irregular heart beats. During a workup for shoulder suregry last year they noted  frequent ectopy.  Pt wore a monitor 12/21/22- 1/4/23  , 9 % AF burden with episodes of rapid rates  in the 130's noted , otherwise had sinus bradycardia. Pt has been on xarelto 20 mg daily in pm . Pt is active works cutting down trees , denies any dizziness , fatigue or chest pain .  Pt was drinking alcoholic drinks daily ( 1-2) that seemed to worsen symptoms but has cut back to 1-2 drinks per week with some improvement noted. Pt to continue xarelto up until pm prior to procedure .      Male now presents in anticipation of elective radiofrequency ablation of atrial fibrillation on 7/14/23 with DR. Arroyo at [facility].       Echocardiogram (date): 12/2022 LVEF 60 %   Stress Test (date):1/2023  normal perfusion   Cardiac CT or MRI (date): NA  Cardiac Cath (date): NA   Cardiac surgery (date): NA

## 2023-06-30 NOTE — H&P PST ADULT - ASSESSMENT
Plan:   Labs pending.   Pre-procedure instructions provided (verbal & written) as follows:  Ablation */*/2021   - Last dose Eliquis/Pradaxa/Xarelto/Savaysa */*/2021 PM (NO a/c day of ablation).   OR - Continue warfarin without interruption.   - NPO after midnight prior except meds w/ sips of water.    - Hold the following medications the morning of the procedure: ***Check MD note for possible instructions re: holding antiarrhythmic medications*** Plan:   Labs pending.   Pre-procedure instructions provided (verbal & written) as follows:  Ablation */*   - Last dose Eliquis/Pradaxa/Xarelto/Savaysa */*/ PM (NO a/c day of ablation).   OR - Continue warfarin without interruption.   - NPO after midnight prior except meds w/ sips of water.    - Hold the following medications the morning of the procedure: ***Check MD note for possible instructions re: holding antiarrhythmic medications***    OPIOID RISK TOOL    RENEE EACH BOX THAT APPLIES AND ADD TOTALS AT THE END    FAMILY HISTORY OF SUBSTANCE ABUSE                 FEMALE         MALE                                                Alcohol                             [  ]1 pt          [  ]3pts                                               Illegal Durgs                     [  ]2 pts        [  ]3pts                                               Rx Drugs                           [  ]4 pts        [  ]4 pts    PERSONAL HISTORY OF SUBSTANCE ABUSE                                                                                          Alcohol                             [  ]3 pts       [  ]3 pts                                               Illegal Durgs                     [  ]4 pts        [  ]4 pts                                               Rx Drugs                           [  ]5 pts        [  ]5 pts    AGE BETWEEN 16-45 YEARS                                      [  ]1 pt         [  ]1 pt    HISTORY OF PREADOLESCENT   SEXUAL ABUSE                                                             [  ]3 pts        [  ]0pts    PSYCHOLOGICAL DISEASE                     ADD, OCD, Bipolar, Schizophrenia        [  ]2 pts         [  ]2 pts                      Depression                                               [  ]1 pt           [  ]1 pt           SCORING TOTAL   (add numbers and type here)              (***)                                     A score of 3 or lower indicated LOW risk for future opiod abuse  A score of 4 to 7 indicated moderate risk for future opiod abuse  A score of 8 or higher indicates a high risk for opiod abuse  CAPRINI SCORE [CLOT]    AGE RELATED RISK FACTORS                                                       MOBILITY RELATED FACTORS  [ ] Age 41-60 years                                            (1 Point)                  [ ] Bed rest                                                        (1 Point)  [ ] Age: 61-74 years                                           (2 Points)                 [ ] Plaster cast                                                   (2 Points)  [ ] Age= 75 years                                              (3 Points)                 [ ] Bed bound for more than 72 hours                 (2 Points)    DISEASE RELATED RISK FACTORS                                               GENDER SPECIFIC FACTORS  [ ] Edema in the lower extremities                       (1 Point)                  [ ] Pregnancy                                                     (1 Point)  [ ] Varicose veins                                               (1 Point)                  [ ] Post-partum < 6 weeks                                   (1 Point)             [ ] BMI > 25 Kg/m2                                            (1 Point)                  [ ] Hormonal therapy  or oral contraception          (1 Point)                 [ ] Sepsis (in the previous month)                        (1 Point)                  [ ] History of pregnancy complications                 (1 point)  [ ] Pneumonia or serious lung disease                                               [ ] Unexplained or recurrent                     (1 Point)           (in the previous month)                               (1 Point)  [ ] Abnormal pulmonary function test                     (1 Point)                 SURGERY RELATED RISK FACTORS  [ ] Acute myocardial infarction                              (1 Point)                 [ ]  Section                                             (1 Point)  [ ] Congestive heart failure (in the previous month)  (1 Point)               [ ] Minor surgery                                                  (1 Point)   [ ] Inflammatory bowel disease                             (1 Point)                 [ ] Arthroscopic surgery                                        (2 Points)  [ ] Central venous access                                      (2 Points)                [ ] General surgery lasting more than 45 minutes   (2 Points)       [ ] Stroke (in the previous month)                          (5 Points)               [ ] Elective arthroplasty                                         (5 Points)                                                                                                                                               HEMATOLOGY RELATED FACTORS                                                 TRAUMA RELATED RISK FACTORS  [ ] Prior episodes of VTE                                     (3 Points)                [ ] Fracture of the hip, pelvis, or leg                       (5 Points)  [ ] Positive family history for VTE                         (3 Points)                 [ ] Acute spinal cord injury (in the previous month)  (5 Points)  [ ] Prothrombin 27782 A                                     (3 Points)                 [ ] Paralysis  (less than 1 month)                             (5 Points)  [ ] Factor V Leiden                                             (3 Points)                  [ ] Multiple Trauma within 1 month                        (5 Points)  [ ] Lupus anticoagulants                                     (3 Points)                                                           [ ] Anticardiolipin antibodies                               (3 Points)                                                       [ ] High homocysteine in the blood                      (3 Points)                                             [ ] Other congenital or acquired thrombophilia      (3 Points)                                                [ ] Heparin induced thrombocytopenia                  (3 Points)                                          Total Score [          ]    Caprini Score 0 - 2:  Low Risk, No VTE Prophylaxis required for most patients, encourage ambulation  Caprini Score 3 - 6:  At Risk, pharmacologic VTE prophylaxis is indicated for most patients (in the absence of a contraindication)  Caprini Score Greater than or = 7:  High Risk, pharmacologic VTE prophylaxis is indicated for most patients (in the absence of a contraindication) Plan: 63y Male with history of  symptomatic paroxysmal  atrial fibrillation. Pt states he has episodes of  palpitations over the past few years feels rapid and irregular heart beats. During a workup for shoulder surgery  last year they noted  frequent ectopy.  Pt wore a monitor 22- 23  , 9 % AF burden with episodes of rapid rates  in the 130's noted , otherwise had sinus bradycardia. Pt has been on xarelto 20 mg daily in pm . Pt is active works cutting down trees , denies any dizziness , fatigue or chest pain .  Pt was drinking alcoholic drinks daily ( 1-2) that seemed to worsen symptoms but has cut back to 1-2 drinks per week with some improvement noted. Pt to continue xarelto up until pm prior to procedure .      Male now presents in anticipation of elective radiofrequency ablation of atrial fibrillation on 23 with DR. Arroyo at [facility].     Labs pending.   Pre-procedure instructions provided (verbal & written) as follows:  Ablation 23   - Last dose Xarelto 23 pm      OPIOID RISK TOOL    RENEE EACH BOX THAT APPLIES AND ADD TOTALS AT THE END    FAMILY HISTORY OF SUBSTANCE ABUSE                 FEMALE         MALE                                                Alcohol                             [  ]1 pt          [  ]3pts                                               Illegal Durgs                     [  ]2 pts        [  ]3pts                                               Rx Drugs                           [  ]4 pts        [  ]4 pts    PERSONAL HISTORY OF SUBSTANCE ABUSE                                                                                          Alcohol                             [  ]3 pts       [  ]3 pts                                               Illegal Durgs                     [  ]4 pts        [  ]4 pts                                               Rx Drugs                           [  ]5 pts        [  ]5 pts    AGE BETWEEN 16-45 YEARS                                      [  ]1 pt         [  ]1 pt    HISTORY OF PREADOLESCENT   SEXUAL ABUSE                                                             [  ]3 pts        [  ]0pts    PSYCHOLOGICAL DISEASE                     ADD, OCD, Bipolar, Schizophrenia        [  ]2 pts         [  ]2 pts                      Depression                                               [  ]1 pt           [  ]1 pt           SCORING TOTAL   (add numbers and type here)              (0***)                                     A score of 3 or lower indicated LOW risk for future opiod abuse  A score of 4 to 7 indicated moderate risk for future opiod abuse  A score of 8 or higher indicates a high risk for opiod abuse  CAPRINI VTE 2.0 SCORE [CLOT updated 2019]    AGE RELATED RISK FACTORS                                                       MOBILITY RELATED FACTORS  [ ] Age 41-60 years                                            (1 Point)                    [ ] Bed rest                                                        (1 Point)  [X ] Age: 61-74 years                                           (2 Points)                  [ ] Plaster cast                                                   (2 Points)  [ ] Age= 75 years                                              (3 Points)                    [ ] Bed bound for more than 72 hours                 (2 Points)    DISEASE RELATED RISK FACTORS                                               GENDER SPECIFIC FACTORS  [ ] Edema in the lower extremities                       (1 Point)              [ ] Pregnancy                                                     (1 Point)  [ ] Varicose veins                                               (1 Point)                     [ ] Post-partum < 6 weeks                                   (1 Point)             [ ] BMI > 25 Kg/m2                                            (1 Point)                     [ ] Hormonal therapy  or oral contraception          (1 Point)                 [ ] Sepsis (in the previous month)                        (1 Point)               [ ] History of pregnancy complications                 (1 point)  [ ] Pneumonia or serious lung disease                                               [ ] Unexplained or recurrent                     (1 Point)           (in the previous month)                               (1 Point)  [ ] Abnormal pulmonary function test                     (1 Point)                 SURGERY RELATED RISK FACTORS  [ ] Acute myocardial infarction                              (1 Point)               [ ]  Section                                             (1 Point)  [ ] Congestive heart failure (in the previous month)  (1 Point)      [x ] Minor surgery                                                  (1 Point)   [ ] Inflammatory bowel disease                             (1 Point)               [ ] Arthroscopic surgery                                        (2 Points)  [ ] Central venous access                                      (2 Points)                [ ] General surgery lasting more than 45 minutes (2 points)  [ ] Malignancy- Present or previous                   (2 Points)                [ ] Elective arthroplasty                                         (5 points)    [ ] Stroke (in the previous month)                          (5 Points)                                                                                                                                                           HEMATOLOGY RELATED FACTORS                                                 TRAUMA RELATED RISK FACTORS  [ ] Prior episodes of VTE                                     (3 Points)                [ ] Fracture of the hip, pelvis, or leg                       (5 Points)  [ ] Positive family history for VTE                         (3 Points)             [ ] Acute spinal cord injury (in the previous month)  (5 Points)  [ ] Prothrombin 03845 A                                     (3 Points)               [ ] Paralysis  (less than 1 month)                             (5 Points)  [ ] Factor V Leiden                                             (3 Points)                  [ ] Multiple Trauma within 1 month                        (5 Points)  [ ] Lupus anticoagulants                                     (3 Points)                                                           [ ] Anticardiolipin antibodies                               (3 Points)                                                       [ ] High homocysteine in the blood                      (3 Points)                                             [ ] Other congenital or acquired thrombophilia      (3 Points)                                                [ ] Heparin induced thrombocytopenia                  (3 Points)                                     Total Score [   3       ]    OPIOID RISK TOOL    RENEE EACH BOX THAT APPLIES AND ADD TOTALS AT THE END    FAMILY HISTORY OF SUBSTANCE ABUSE                 FEMALE         MALE                                                Alcohol                             [  ]1 pt          [  ]3pts                                               Illegal Durgs                     [  ]2 pts        [  ]3pts                                               Rx Drugs                           [  ]4 pts        [  ]4 pts    PERSONAL HISTORY OF SUBSTANCE ABUSE                                                                                          Alcohol                             [  ]3 pts       [  ]3 pts                                               Illegal Durgs                     [  ]4 pts        [  ]4 pts                                               Rx Drugs                           [  ]5 pts        [  ]5 pts    AGE BETWEEN 16-45 YEARS                                      [  ]1 pt         [  ]1 pt    HISTORY OF PREADOLESCENT   SEXUAL ABUSE                                                             [  ]3 pts        [  ]0pts    PSYCHOLOGICAL DISEASE                     ADD, OCD, Bipolar, Schizophrenia        [  ]2 pts         [  ]2 pts                      Depression                                               [  ]1 pt           [  ]1 pt           SCORING TOTAL   (add numbers and type here)              (***)                                     A score of 3 or lower indicated LOW risk for future opiod abuse  A score of 4 to 7 indicated moderate risk for future opiod abuse  A score of 8 or higher indicates a high risk for opiod abuse  CAPRINI SCORE [CLOT]    AGE RELATED RISK FACTORS                                                       MOBILITY RELATED FACTORS  [ ] Age 41-60 years                                            (1 Point)                  [ ] Bed rest                                                        (1 Point)  [ ] Age: 61-74 years                                           (2 Points)                 [ ] Plaster cast                                                   (2 Points)  [ ] Age= 75 years                                              (3 Points)                 [ ] Bed bound for more than 72 hours                 (2 Points)    DISEASE RELATED RISK FACTORS                                               GENDER SPECIFIC FACTORS  [ ] Edema in the lower extremities                       (1 Point)                  [ ] Pregnancy                                                     (1 Point)  [ ] Varicose veins                                               (1 Point)                  [ ] Post-partum < 6 weeks                                   (1 Point)             [ ] BMI > 25 Kg/m2                                            (1 Point)                  [ ] Hormonal therapy  or oral contraception          (1 Point)                 [ ] Sepsis (in the previous month)                        (1 Point)                  [ ] History of pregnancy complications                 (1 point)  [ ] Pneumonia or serious lung disease                                               [ ] Unexplained or recurrent                     (1 Point)           (in the previous month)                               (1 Point)  [ ] Abnormal pulmonary function test                     (1 Point)                 SURGERY RELATED RISK FACTORS  [ ] Acute myocardial infarction                              (1 Point)                 [ ]  Section                                             (1 Point)  [ ] Congestive heart failure (in the previous month)  (1 Point)               [ ] Minor surgery                                                  (1 Point)   [ ] Inflammatory bowel disease                             (1 Point)                 [ ] Arthroscopic surgery                                        (2 Points)  [ ] Central venous access                                      (2 Points)                [ ] General surgery lasting more than 45 minutes   (2 Points)       [ ] Stroke (in the previous month)                          (5 Points)               [ ] Elective arthroplasty                                         (5 Points)                                                                                                                                               HEMATOLOGY RELATED FACTORS                                                 TRAUMA RELATED RISK FACTORS  [ ] Prior episodes of VTE                                     (3 Points)                [ ] Fracture of the hip, pelvis, or leg                       (5 Points)  [ ] Positive family history for VTE                         (3 Points)                 [ ] Acute spinal cord injury (in the previous month)  (5 Points)  [ ] Prothrombin 53158 A                                     (3 Points)                 [ ] Paralysis  (less than 1 month)                             (5 Points)  [ ] Factor V Leiden                                             (3 Points)                  [ ] Multiple Trauma within 1 month                        (5 Points)  [ ] Lupus anticoagulants                                     (3 Points)                                                           [ ] Anticardiolipin antibodies                               (3 Points)                                                       [ ] High homocysteine in the blood                      (3 Points)                                             [ ] Other congenital or acquired thrombophilia      (3 Points)                                                [ ] Heparin induced thrombocytopenia                  (3 Points)                                          Total Score [          ]    Caprini Score 0 - 2:  Low Risk, No VTE Prophylaxis required for most patients, encourage ambulation  Caprini Score 3 - 6:  At Risk, pharmacologic VTE prophylaxis is indicated for most patients (in the absence of a contraindication)  Caprini Score Greater than or = 7:  High Risk, pharmacologic VTE prophylaxis is indicated for most patients (in the absence of a contraindication)

## 2023-07-14 ENCOUNTER — TRANSCRIPTION ENCOUNTER (OUTPATIENT)
Age: 63
End: 2023-07-14

## 2023-07-14 ENCOUNTER — INPATIENT (INPATIENT)
Facility: HOSPITAL | Age: 63
LOS: 0 days | Discharge: HOME | DRG: 274 | End: 2023-07-15
Attending: STUDENT IN AN ORGANIZED HEALTH CARE EDUCATION/TRAINING PROGRAM | Admitting: STUDENT IN AN ORGANIZED HEALTH CARE EDUCATION/TRAINING PROGRAM
Payer: COMMERCIAL

## 2023-07-14 VITALS
HEART RATE: 51 BPM | RESPIRATION RATE: 16 BRPM | TEMPERATURE: 98 F | OXYGEN SATURATION: 93 % | SYSTOLIC BLOOD PRESSURE: 126 MMHG | WEIGHT: 156.09 LBS | HEIGHT: 70 IN | DIASTOLIC BLOOD PRESSURE: 70 MMHG

## 2023-07-14 DIAGNOSIS — H26.9 UNSPECIFIED CATARACT: Chronic | ICD-10-CM

## 2023-07-14 DIAGNOSIS — Z98.890 OTHER SPECIFIED POSTPROCEDURAL STATES: Chronic | ICD-10-CM

## 2023-07-14 DIAGNOSIS — I48.0 PAROXYSMAL ATRIAL FIBRILLATION: ICD-10-CM

## 2023-07-14 LAB
ABO RH CONFIRMATION: SIGNIFICANT CHANGE UP
GLUCOSE BLDC GLUCOMTR-MCNC: 99 MG/DL — SIGNIFICANT CHANGE UP (ref 70–99)

## 2023-07-14 PROCEDURE — 93656 COMPRE EP EVAL ABLTJ ATR FIB: CPT

## 2023-07-14 PROCEDURE — 93657 TX L/R ATRIAL FIB ADDL: CPT

## 2023-07-14 RX ORDER — FUROSEMIDE 40 MG
20 TABLET ORAL ONCE
Refills: 0 | Status: COMPLETED | OUTPATIENT
Start: 2023-07-14 | End: 2023-07-14

## 2023-07-14 RX ORDER — ACETAMINOPHEN 500 MG
650 TABLET ORAL EVERY 6 HOURS
Refills: 0 | Status: DISCONTINUED | OUTPATIENT
Start: 2023-07-14 | End: 2023-07-15

## 2023-07-14 RX ORDER — BENZOCAINE AND MENTHOL 5; 1 G/100ML; G/100ML
1 LIQUID ORAL
Refills: 0 | Status: DISCONTINUED | OUTPATIENT
Start: 2023-07-14 | End: 2023-07-15

## 2023-07-14 RX ORDER — FUROSEMIDE 40 MG
20 TABLET ORAL DAILY
Refills: 0 | Status: DISCONTINUED | OUTPATIENT
Start: 2023-07-15 | End: 2023-07-15

## 2023-07-14 RX ORDER — SUCRALFATE 1 G
1 TABLET ORAL EVERY 12 HOURS
Refills: 0 | Status: DISCONTINUED | OUTPATIENT
Start: 2023-07-14 | End: 2023-07-15

## 2023-07-14 RX ORDER — RIVAROXABAN 15 MG-20MG
1 KIT ORAL
Refills: 0 | DISCHARGE

## 2023-07-14 RX ORDER — FUROSEMIDE 40 MG
1 TABLET ORAL
Qty: 3 | Refills: 0
Start: 2023-07-14 | End: 2023-07-16

## 2023-07-14 RX ORDER — PANTOPRAZOLE SODIUM 20 MG/1
1 TABLET, DELAYED RELEASE ORAL
Qty: 75 | Refills: 0
Start: 2023-07-14

## 2023-07-14 RX ORDER — SUCRALFATE 1 G
10 TABLET ORAL
Qty: 280 | Refills: 0
Start: 2023-07-14 | End: 2023-07-27

## 2023-07-14 RX ORDER — PANTOPRAZOLE SODIUM 20 MG/1
40 TABLET, DELAYED RELEASE ORAL
Refills: 0 | Status: DISCONTINUED | OUTPATIENT
Start: 2023-07-14 | End: 2023-07-15

## 2023-07-14 RX ORDER — RIVAROXABAN 15 MG-20MG
20 KIT ORAL
Refills: 0 | Status: DISCONTINUED | OUTPATIENT
Start: 2023-07-14 | End: 2023-07-15

## 2023-07-14 RX ADMIN — Medication 20 MILLIGRAM(S): at 17:25

## 2023-07-14 RX ADMIN — Medication 1 GRAM(S): at 17:26

## 2023-07-14 RX ADMIN — Medication 650 MILLIGRAM(S): at 19:37

## 2023-07-14 RX ADMIN — PANTOPRAZOLE SODIUM 40 MILLIGRAM(S): 20 TABLET, DELAYED RELEASE ORAL at 17:26

## 2023-07-14 RX ADMIN — Medication 650 MILLIGRAM(S): at 20:37

## 2023-07-14 RX ADMIN — RIVAROXABAN 20 MILLIGRAM(S): KIT at 17:26

## 2023-07-14 NOTE — DISCHARGE NOTE PROVIDER - CARE PROVIDER_API CALL
Tomer Arroyo  Cardiac Electrophysiology  [address] [place] [address]  Phone: [phone]  Fax: [phone]  Follow Up Time:

## 2023-07-14 NOTE — DISCHARGE NOTE PROVIDER - NSDCMRMEDTOKEN_GEN_ALL_CORE_FT
Xarelto 20 mg oral tablet: 1 orally once a day (at bedtime)   furosemide 20 mg oral tablet: 1 tab(s) orally once a day x 3 days post ablation, then STOP.  pantoprazole 40 mg oral delayed release tablet: 1 tab(s) orally 2 times a day x 2 weeks post ablation, then once daily x 6 weeks, then STOP.  sucralfate 1 g/10 mL oral suspension: 10 milliliter(s) orally every 12 hours x 14 days post ablation, then STOP.  Xarelto 20 mg oral tablet: 1 orally once a day (at bedtime)

## 2023-07-14 NOTE — DISCHARGE NOTE PROVIDER - HOSPITAL COURSE
63 year old gentleman with history of symptomatic paroxysmal atrial fibrillation. He has had high burden of pAF and atrial flutter on recent event monitor (9%, episodes over 8hours). His symptoms have been mild-moderate, but episodes have recently been increasing in frequency and duration. Medical therapy has been limited by sinus bradycardia with resting heart rates to 40s bpm. He presented electively 7/14/23 and underwent uncomplicated ablation of atrial fibrillation (WAPVI, CTI) via b/l FV access. 63 year old gentleman with history of symptomatic paroxysmal atrial fibrillation. He has had high burden of pAF and atrial flutter on recent event monitor (9%, episodes over 8hours). His symptoms have been mild-moderate, but episodes have recently been increasing in frequency and duration. Medical therapy has been limited by sinus bradycardia with resting heart rates to 40s bpm. He presented electively 7/14/23 and underwent uncomplicated ablation of atrial fibrillation (WAPVI, CTI) via b/l FV access. The patient was observed overnight without event and was discharged home the following morning with a plan for outpatient follow up.

## 2023-07-14 NOTE — DISCHARGE NOTE PROVIDER - NSDCFUADDINST_GEN_ALL_CORE_FT
Follow up with Dr. Arroyo in 4-6 weeks. Our office will contact you in 3-5 days to schedule this appointment. Please call [phone] with questions or concerns.

## 2023-07-14 NOTE — DISCHARGE NOTE PROVIDER - NSDCCAREPROVSEEN_GEN_ALL_CORE_FT
Indication: Chest pain



Technique: XRAY CHEST 1 V



Comparison: None.



Findings: The cardiomediastinal silhouette is normal. The lungs are clear. There is

no evidence of pleural fluid. The bones are unremarkable.



Impression:



Normal chest.
Tomer Arroyo

## 2023-07-14 NOTE — DISCHARGE NOTE PROVIDER - NSDCCPTREATMENT_GEN_ALL_CORE_FT
PRINCIPAL PROCEDURE  Procedure: Radiofrequency ablation, arrhythmogenic focus, for atrial fibrillation  Findings and Treatment: - Bruising at the groin, sometimes extending down the leg, and/or a small lump under the skin at the groin access site is normal and will resolve within 2 – 3 weeks.   - Occasional skipped beats or palpitations that last for a few beats are common and generally resolve within 1-2 months.   - You may walk and take stairs at a regular pace.   - Do not perform any exercise more strenuous than walking for 1 week.   - Do not strain or lift heavy objects for 1 week.  - You may shower the day after the procedure.  - Do not soak in water (such as tub baths, hot tubs, swimming, etc.) for 1 week.   - You may resume all other activities the day after the procedure.  Call your doctor if:   - you notice bleeding, redness, drainage, swelling, increased tenderness or a hot sensation around the catheter insertion site.   - your temperature is greater than 100 degrees F for more than 24 hours.  - your rapid heart rhythm returns.  - you have any questions or concerns regarding the procedure.  If significant bleeding and/or a large lump (the size of a golf ball or bigger) occurs:  - Lie flat and apply continuous direct pressure just above the puncture site for at least 10 minutes  - If the issue resolves, notify your physician immediately.    - If the bleeding cannot be controlled, please seek immediate medical attention.  If you experience increased difficulty breathing or chest pain, or if you faint or have dizzy spells, please seek immediate medical attention.

## 2023-07-14 NOTE — CHART NOTE - NSCHARTNOTEFT_GEN_A_CORE
Patient reports right heels pain and occasional tingling/numbness once he woke from anaesthesia. No obvious signs of trauma, skin is intact, with strong distal pulses in the RLE and LLE. No motor or sensory deficits  Will observe for now

## 2023-07-15 ENCOUNTER — TRANSCRIPTION ENCOUNTER (OUTPATIENT)
Age: 63
End: 2023-07-15

## 2023-07-15 VITALS — HEART RATE: 62 BPM | DIASTOLIC BLOOD PRESSURE: 60 MMHG | SYSTOLIC BLOOD PRESSURE: 113 MMHG | RESPIRATION RATE: 16 BRPM

## 2023-07-15 LAB
ANION GAP SERPL CALC-SCNC: 10 MMOL/L — SIGNIFICANT CHANGE UP (ref 5–17)
BUN SERPL-MCNC: 16.3 MG/DL — SIGNIFICANT CHANGE UP (ref 8–20)
CALCIUM SERPL-MCNC: 7.9 MG/DL — LOW (ref 8.4–10.5)
CHLORIDE SERPL-SCNC: 103 MMOL/L — SIGNIFICANT CHANGE UP (ref 96–108)
CO2 SERPL-SCNC: 26 MMOL/L — SIGNIFICANT CHANGE UP (ref 22–29)
CREAT SERPL-MCNC: 0.7 MG/DL — SIGNIFICANT CHANGE UP (ref 0.5–1.3)
EGFR: 102 ML/MIN/1.73M2 — SIGNIFICANT CHANGE UP
GLUCOSE SERPL-MCNC: 92 MG/DL — SIGNIFICANT CHANGE UP (ref 70–99)
HCT VFR BLD CALC: 43.1 % — SIGNIFICANT CHANGE UP (ref 39–50)
HGB BLD-MCNC: 14.5 G/DL — SIGNIFICANT CHANGE UP (ref 13–17)
MAGNESIUM SERPL-MCNC: 1.8 MG/DL — SIGNIFICANT CHANGE UP (ref 1.6–2.6)
MCHC RBC-ENTMCNC: 31.7 PG — SIGNIFICANT CHANGE UP (ref 27–34)
MCHC RBC-ENTMCNC: 33.6 GM/DL — SIGNIFICANT CHANGE UP (ref 32–36)
MCV RBC AUTO: 94.3 FL — SIGNIFICANT CHANGE UP (ref 80–100)
PLATELET # BLD AUTO: 150 K/UL — SIGNIFICANT CHANGE UP (ref 150–400)
POTASSIUM SERPL-MCNC: 4 MMOL/L — SIGNIFICANT CHANGE UP (ref 3.5–5.3)
POTASSIUM SERPL-SCNC: 4 MMOL/L — SIGNIFICANT CHANGE UP (ref 3.5–5.3)
RBC # BLD: 4.57 M/UL — SIGNIFICANT CHANGE UP (ref 4.2–5.8)
RBC # FLD: 12.1 % — SIGNIFICANT CHANGE UP (ref 10.3–14.5)
SODIUM SERPL-SCNC: 139 MMOL/L — SIGNIFICANT CHANGE UP (ref 135–145)
WBC # BLD: 9.38 K/UL — SIGNIFICANT CHANGE UP (ref 3.8–10.5)
WBC # FLD AUTO: 9.38 K/UL — SIGNIFICANT CHANGE UP (ref 3.8–10.5)

## 2023-07-15 PROCEDURE — 36415 COLL VENOUS BLD VENIPUNCTURE: CPT

## 2023-07-15 PROCEDURE — 85027 COMPLETE CBC AUTOMATED: CPT

## 2023-07-15 PROCEDURE — 82962 GLUCOSE BLOOD TEST: CPT

## 2023-07-15 PROCEDURE — 80048 BASIC METABOLIC PNL TOTAL CA: CPT

## 2023-07-15 PROCEDURE — C1894: CPT

## 2023-07-15 PROCEDURE — C1889: CPT

## 2023-07-15 PROCEDURE — 93005 ELECTROCARDIOGRAM TRACING: CPT

## 2023-07-15 PROCEDURE — 93010 ELECTROCARDIOGRAM REPORT: CPT

## 2023-07-15 PROCEDURE — 83735 ASSAY OF MAGNESIUM: CPT

## 2023-07-15 PROCEDURE — 93623 PRGRMD STIMJ&PACG IV RX NFS: CPT

## 2023-07-15 PROCEDURE — 93657 TX L/R ATRIAL FIB ADDL: CPT

## 2023-07-15 PROCEDURE — C1759: CPT

## 2023-07-15 PROCEDURE — 93656 COMPRE EP EVAL ABLTJ ATR FIB: CPT

## 2023-07-15 PROCEDURE — C1731: CPT

## 2023-07-15 PROCEDURE — C1732: CPT

## 2023-07-15 PROCEDURE — 93622 COMP EP EVAL L VENTR PAC&REC: CPT

## 2023-07-15 PROCEDURE — 93613 INTRACARDIAC EPHYS 3D MAPG: CPT

## 2023-07-15 PROCEDURE — C1766: CPT

## 2023-07-15 RX ORDER — MAGNESIUM SULFATE 500 MG/ML
2 VIAL (ML) INJECTION ONCE
Refills: 0 | Status: COMPLETED | OUTPATIENT
Start: 2023-07-15 | End: 2023-07-15

## 2023-07-15 RX ADMIN — Medication 20 MILLIGRAM(S): at 06:19

## 2023-07-15 RX ADMIN — Medication 50 GRAM(S): at 07:44

## 2023-07-15 RX ADMIN — Medication 1 GRAM(S): at 06:19

## 2023-07-15 RX ADMIN — PANTOPRAZOLE SODIUM 40 MILLIGRAM(S): 20 TABLET, DELAYED RELEASE ORAL at 06:19

## 2023-07-15 NOTE — DISCHARGE NOTE NURSING/CASE MANAGEMENT/SOCIAL WORK - NSDCPEXARELTO_GEN_ALL_CORE
Pt discharged per ambulatory, no acute distress on discharge, written inst given to pt , verbalizes understanding Patient armband removed and shredded Rivaroxaban/Xarelto - Compliance/Rivaroxaban/Xarelto - Dietary Advice/Rivaroxaban/Xarelto - Follow up monitoring/Rivaroxaban/Xarelto - Potential for adverse drug reactions and interactions

## 2023-07-15 NOTE — PROGRESS NOTE ADULT - SUBJECTIVE AND OBJECTIVE BOX
Admission Criteria  Please admit the patient to the following service: CARDIOLOGY  Major Criteria:  - Significant volume load > 200 ml    Admit to: 3GUL     Patient is being admitted to the inpatient service due to high risk characteristics and need for further management/monitoring and is considered to be at a significantly increased risk of major adverse cardiac and vascular events if discharged.  
PROCEDURE(S): Radiofrequency Ablation of Atrial Fibrillation    ELECTRPHYSIOLOGIST(S): Tomer Arroyo MD         COMPLICATIONS:  none        DISPOSITION:  observation     CONDITION: stable  EBL: <15mL    Pt doing well s/p atrial fibrillation ablation via b/l [facility] access. Denies complaint.       MEDICATIONS  (STANDING):  furosemide   Injectable 20 milliGRAM(s) IV Push once  pantoprazole    Tablet 40 milliGRAM(s) Oral two times a day  rivaroxaban 20 milliGRAM(s) Oral <User Schedule>  sucralfate suspension 1 Gram(s) Oral every 12 hours    MEDICATIONS  (PRN):  acetaminophen     Tablet .. 650 milliGRAM(s) Oral every 6 hours PRN Mild Pain (1 - 3)  aluminum hydroxide/magnesium hydroxide/simethicone Suspension 30 milliLiter(s) Oral every 4 hours PRN Dyspepsia  benzocaine/menthol Lozenge 1 Lozenge Oral every 2 hours PRN Sore Throat      Allergies    No Known Allergies    Intolerances          Exam:   T(C): 36.5 (07-14-23 @ 07:07), Max: 36.5 (07-14-23 @ 07:07)  HR: 66 (07-14-23 @ 12:45) (51 - 66)  BP: 100/59 (07-14-23 @ 12:30) (100/59 - 126/70)  RR: 16 (07-14-23 @ 12:45) (16 - 16)  SpO2: 99% (07-14-23 @ 12:45) (93% - 99%)  Wt(kg): --  Gen: VSS, NAD, A&O x 3  Card: S1/S2, RRR, no m/g/r  Resp: lungs CTA b/l  Abd: S/NT/ND  Groins: hemostatic sutures in place; sites C/D/I b/l; no bleeding, hematoma, erythema, exudate or edema  Ext: no edema; distal pulses intact  Neuro: CN II-XII grossly intact, ABEL x4 with strength and sensation intact and equal bilaterally    I/Os: net + 2220mL    ECG: sinus rhythm, intact conduction, normal axis & intervals (QTc 470ms - manual)    Assessment:   63y Male h/o symptomatic paroxysmal atrial fibrillation, with episodes recently increasing in frequency and duration.  Now status post uncomplicated radiofrequency ablation of atrial fibrillation (WAPVI, CTI).      Plan:   Bedrest x 4 hours, then OOB with assistance and progress as tolerated.   Groin sutures to be removed by EP service in AM.   Pending groin status: Xarelto 20mg PO qPM to resume at 1600.   DO NOT HOLD, INTERRUPT OR REVERSE ANTICOAGULATION WITHOUT EXPLICIT APPROVAL FROM EP SERVICE.   Lasix 20mg IV x 1 dose once ambulating, then Lasix 20mg PO daily x 3 days.   Start Protonix 40mg twice daily x 2 weeks, then once daily x 6 weeks.   Start Carafate 1gm BID x 2 weeks.   Continue other home medications.   Strict I/Os.  Please encourage incentive spirometry and ambulation once able.  Observation and monitoring on telemetry overnight with anticipated discharge in the AM and outpt follow up in 2-4 weeks. 
Pt doing well POD #1 s/p radiofrequency ablation of atrial fibrillation. Denies complaint. Heel pain resolved with ambulation yesterday PM.     EKG: sinus rhythm, intact conduction, normal axis & intervals, baseline ST abnormalities; no acute changes  TELE: sinus rhythm, intact conduction, rare PVC, no sustained arrhythmias    MEDICATIONS  (STANDING):  furosemide    Tablet 20 milliGRAM(s) Oral daily  pantoprazole    Tablet 40 milliGRAM(s) Oral two times a day  rivaroxaban 20 milliGRAM(s) Oral <User Schedule>  sucralfate suspension 1 Gram(s) Oral every 12 hours    MEDICATIONS  (PRN):  acetaminophen     Tablet .. 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  History of shoulder surgery  right tendon repair 2011  History of incision and drainage  History of intestinal surgery  as a child due to Hirshsprung's  Cataract      Vital Signs Last 24 Hrs  T(C): 36.6 (15 Jul 2023 06:15), Max: 36.6 (15 Jul 2023 06:15)  T(F): 97.9 (15 Jul 2023 06:15), Max: 97.9 (15 Jul 2023 06:15)  HR: 61 (15 Jul 2023 06:15) (60 - 67)  BP: 105/68 (15 Jul 2023 06:15) (98/63 - 121/80)  BP(mean): 82 (15 Jul 2023 06:15) (76 - 82)  RR: 16 (15 Jul 2023 06:15) (16 - 16)  SpO2: 97% (14 Jul 2023 19:15) (97% - 100%)    Parameters below as of 15 Jul 2023 06:15  Patient On (Oxygen Delivery Method): room air        Physical Exam:  Constitutional: NAD, AAOx3  Cardiovascular: +S1S2 RRR  Pulmonary: CTA b/l, unlabored  Abd: soft NTND +BS  Groins: hemostatic sutures removed; sites C/D/I bilaterally; no bleeding, hematoma, edema  Extremities: no pedal edema, +distal pulses b/l  Neuro: CN II-XII grossly intact, ABEL x4     LABS:                      14.5   9.38  )-----------( 150      ( 15 Jul 2023 06:15 )             43.1     139  |  103  |  16.3  ----------------------------<  92  4.0   |  26.0  |  0.70    Ca    7.9<L>      15 Jul 2023 06:15  Mg     1.8     07-15    Urinalysis Basic - ( 15 Jul 2023 06:15 )    Color: x / Appearance: x / SG: x / pH: x  Gluc: 92 mg/dL / Ketone: x  / Bili: x / Urobili: x   Blood: x / Protein: x / Nitrite: x   Leuk Esterase: x / RBC: x / WBC x   Sq Epi: x / Non Sq Epi: x / Bacteria: x      Assessment:   63y Male h/o symptomatic paroxysmal atrial fibrillation, with episodes recently increasing in frequency and duration, and medical options limited by baseline sinus bradycardia. Now POD #1 s/p radiofrequency ablation of atrial fibrillation (WAPVI & CTI) via b/l [facility] access.     Plan:   Xarelto continued - importance of strict compliance reinforced w/ pt, who expressed understanding.  Acute hypomagnesemia post ablation, likely secondary to intraoperative fluid load and subsequent diuresis. Will replete with magnesium 2gm IV now.  Lasix, protonix & carafate per post op protocol.   Pt instructed as to activity limitations - no lifting/pushing/pulling >10 lbs or strenuous exercise x 1 week.   Pt instructed as to access site care and f/up - written instructions included in d/c documents.  Outpt f/up in 4-6 weeks - office will contact pt to schedule.  Ok to d/c home following Mg repletion.   
Pt presents for elective AF ablation. Denies changes in health status since PST. Patient specifically denies chest pain, shortness of breath at rest or with exertion, near/true syncope, fevers/chills, N/V/D, or other cardiac or constitutional symptoms.    In brief, pt is a 63 year old gentleman presenting for ablation of paroxysmal atrial fibrillation. He has had symptomatic paroxysmal AF, with high burden of pAF and atrial flutter on recent event monitor (9%, episodes over 8hours). His symptoms are currently mild-moderate, but episodes have recently been increasing in frequency and duration. Medical therapy has been limited by sinus bradycardia with resting heart rates to 40s bpm. He is maintained on Xarelto and endorses relatively good compliance, although he did miss one dose about 2 weeks ago which he took the next morning. He now presents for elective AF ablation.     Plan:   CHADS-VASC = 1 (age only) and pt is in sinus rhythm today. Will discuss need for COSME w/ Dr. Arroyo given 1 missed/late Xarelto dose.   Consent w/ Dr. Arroyo.   Pt NPO.   PST labs WNL.   D/C teaching initiated.

## 2023-07-15 NOTE — DISCHARGE NOTE NURSING/CASE MANAGEMENT/SOCIAL WORK - PATIENT PORTAL LINK FT
You can access the FollowMyHealth Patient Portal offered by [facility] by registering at the following website: [email or web address]. By joining [employer]’s FollowMyHealth portal, you will also be able to view your health information using other applications (apps) compatible with our system.

## 2023-08-10 ENCOUNTER — APPOINTMENT (OUTPATIENT)
Dept: ELECTROPHYSIOLOGY | Facility: CLINIC | Age: 63
End: 2023-08-10
Payer: MEDICARE

## 2023-08-10 VITALS
HEART RATE: 57 BPM | HEIGHT: 70 IN | SYSTOLIC BLOOD PRESSURE: 98 MMHG | DIASTOLIC BLOOD PRESSURE: 70 MMHG | BODY MASS INDEX: 22.05 KG/M2 | OXYGEN SATURATION: 99 % | WEIGHT: 154 LBS | TEMPERATURE: 98.5 F

## 2023-08-10 DIAGNOSIS — R00.2 PALPITATIONS: ICD-10-CM

## 2023-08-10 DIAGNOSIS — I48.91 UNSPECIFIED ATRIAL FIBRILLATION: ICD-10-CM

## 2023-08-10 PROCEDURE — 99214 OFFICE O/P EST MOD 30 MIN: CPT

## 2023-08-10 PROCEDURE — 93000 ELECTROCARDIOGRAM COMPLETE: CPT

## 2023-08-10 NOTE — ADDENDUM
[FreeTextEntry1] : I was present for the above evaluation and agree with the history, physical exam, assessment and plan as above.  Patient presents in follow-up from recent AF ablation.  He previously had a high AF burden and very long episodes of arrhythmia.  On follow-up he has had brief episodes of palpitation lasting seconds to minutes, which may be related to early post ablation inflammatory reaction.  On EKG today he is in sinus rhythm and he has noted gradual improvement of the palpitation over time.  Will plan to repeat monitoring 3 months post ablation and reconsider management further if he continues to have symptomatic arrhythmia.  He will continue anticoagulation until that time.  He has also noted some abdominal bloating which may be related to decreased gastric emptying.  He has also noted marked improvement with this symptoms over time, however, if these symptoms done resolve will consider pharmacologic management of gastric hypomotility and GI referral.  At this time he denies fevers, chest pain, or bleeding, and there is no concern for esophageal injury that requires further investigation.

## 2023-08-10 NOTE — HISTORY OF PRESENT ILLNESS
[FreeTextEntry1] : 63 year old gentleman presenting for ablation of symptomatic paroxysmal atrial fibrillation. He has had symptomatic paroxysmal AF, with high burden of pAF and atrial flutter on recent event monitor (9%, episodes over 8hours). His symptoms have recently been increasing in frequency and duration. Medical therapy has been limited by sinus bradycardia with resting heart rates to 40s bpm. He is maintained on Xarelto and endorses good compliance. He now presents for follow up s/p AF ablatoin ( PVI, CTI line) on 7/14/23.   Today, reports overall he has been doing well since procedure but has noted intermittent palpitations over the past few weeks. No symptoms since last saturday. Symptoms felt different than AF and typically lasted no more than approximately 5 minutes. He also notes elevated HR during exertion at work and full sensation after meals which has been gradually improving.   ECG reveals SR. Tolerating Xarelto without c/o easy bruising, bleeding, or falls.

## 2023-08-10 NOTE — DISCUSSION/SUMMARY
[EKG obtained to assist in diagnosis and management of assessed problem(s)] : EKG obtained to assist in diagnosis and management of assessed problem(s) [FreeTextEntry1] : 63 year old gentleman presenting for ablation of symptomatic paroxysmal atrial fibrillation. He has had symptomatic paroxysmal AF, with high burden of pAF and atrial flutter on recent event monitor (9%, episodes over 8hours). His symptoms have recently been increasing in frequency and duration. Medical therapy has been limited by sinus bradycardia with resting heart rates to 40s bpm. He is maintained on Xarelto and endorses good compliance. He now presents for follow up s/p AF ablatoin ( PVI, CTI line) on 7/14/23.   Today, reports overall he has been doing well since procedure but has noted intermittent palpitations over the past few weeks. No symptoms since last saturday. Symptoms felt different than AF and typically lasted no more than approximately 5 minutes. He also notes elevated HR during exertion at work and full sensation after meals which has been gradually improving.   ECG reveals SR. Tolerating Xarelto without c/o easy bruising, bleeding, or falls.   Recommendation:   Mr. Spence has overall been doing well s/p AF ablation. Reassured that palpitations and brief arrhythmias may be WNL during post ablation blanking period. A/V yola blocking agents have been avoided due to resting SB. To arrange 1 week holter for arrhythmia surveillance at 3months post ablation. IN regards to fullness sensation discussed that mild gastroparesis can occur post ablation and typically self resolves. He has already noted significant improvement since procedure. Urged him to contact us if does not completely resolve. Medical therapy can be utilized and will refer to GI if persists.  -Continue Xarelto for stroke prophylaxis until next f/u. Will consider discontinuation after monitoring complete as low [id number]. -EP follow up in 3 months or sooner PRN.  Marily Gary ANP-C  lisa all pertinent systems normal

## 2023-08-10 NOTE — REVIEW OF SYSTEMS
[Palpitations] : palpitations [Headache] : no headache [Feeling Fatigued] : not feeling fatigued [SOB] : no shortness of breath [Dyspnea on exertion] : not dyspnea during exertion [Dizziness] : no dizziness [Easy Bleeding] : no tendency for easy bleeding

## 2023-11-02 ENCOUNTER — APPOINTMENT (OUTPATIENT)
Dept: ELECTROPHYSIOLOGY | Facility: CLINIC | Age: 63
End: 2023-11-02

## 2024-01-08 ENCOUNTER — APPOINTMENT (OUTPATIENT)
Dept: ELECTROPHYSIOLOGY | Facility: CLINIC | Age: 64
End: 2024-01-08
Payer: MEDICARE

## 2024-01-08 ENCOUNTER — NON-APPOINTMENT (OUTPATIENT)
Age: 64
End: 2024-01-08

## 2024-01-08 VITALS
BODY MASS INDEX: 23.77 KG/M2 | WEIGHT: 166 LBS | OXYGEN SATURATION: 98 % | SYSTOLIC BLOOD PRESSURE: 118 MMHG | DIASTOLIC BLOOD PRESSURE: 76 MMHG | HEART RATE: 60 BPM | HEIGHT: 70 IN

## 2024-01-08 DIAGNOSIS — Z98.890 OTHER SPECIFIED POSTPROCEDURAL STATES: ICD-10-CM

## 2024-01-08 DIAGNOSIS — Z86.79 OTHER SPECIFIED POSTPROCEDURAL STATES: ICD-10-CM

## 2024-01-08 PROCEDURE — 99214 OFFICE O/P EST MOD 30 MIN: CPT

## 2024-01-08 PROCEDURE — 93000 ELECTROCARDIOGRAM COMPLETE: CPT

## 2024-01-08 RX ORDER — PANTOPRAZOLE 40 MG/1
40 TABLET, DELAYED RELEASE ORAL DAILY
Refills: 0 | Status: DISCONTINUED | COMMUNITY
Start: 2023-08-10 | End: 2024-01-08

## 2024-01-08 NOTE — REVIEW OF SYSTEMS
[Feeling Fatigued] : not feeling fatigued [SOB] : no shortness of breath [Dyspnea on exertion] : not dyspnea during exertion [Chest Discomfort] : no chest discomfort [Palpitations] : palpitations [Syncope] : no syncope [Dizziness] : no dizziness [Easy Bleeding] : no tendency for easy bleeding

## 2024-01-08 NOTE — HISTORY OF PRESENT ILLNESS
[FreeTextEntry1] :    63 year old gentleman presenting for ablation of symptomatic paroxysmal atrial fibrillation. He has had symptomatic paroxysmal AF, with high burden of pAF and atrial flutter on recent event monitor (9%, episodes over 8hours). His symptoms have recently been increasing in frequency and duration. Medical therapy has been limited by sinus bradycardia with resting heart rates to 40s bpm. He is maintained on Xarelto and endorses good compliance. He now presents for follow up s/p AF ablatoin ( PVI, CTI line) on 7/14/23.  Holter worn 10/9/23-10/22/23 revealed SR with multiple brief episodes of PAT- no sustained events ( longest 14 beats in duration). AVG HR 76 bpm, min 76, max 142.   Today, MR. Spence reports he has overall been feeling well since procedure. Denies symptoms of sustained AF as prior to ablation but did not brief palpitations lasting only a few seconds which began to improve about 3 months post ablation. As in past expressed desire to avoid lifelong AC.   ECG reveals SB 54 bpm.

## 2024-03-05 ENCOUNTER — OFFICE (OUTPATIENT)
Dept: URBAN - METROPOLITAN AREA CLINIC 12 | Facility: CLINIC | Age: 64
Setting detail: OPHTHALMOLOGY
End: 2024-03-05
Payer: MEDICARE

## 2024-03-05 DIAGNOSIS — H25.13: ICD-10-CM

## 2024-03-05 DIAGNOSIS — H43.393: ICD-10-CM

## 2024-03-05 DIAGNOSIS — H35.373: ICD-10-CM

## 2024-03-05 DIAGNOSIS — H40.013: ICD-10-CM

## 2024-03-05 DIAGNOSIS — H18.513: ICD-10-CM

## 2024-03-05 PROCEDURE — 99204 OFFICE O/P NEW MOD 45 MIN: CPT | Performed by: OPHTHALMOLOGY

## 2024-03-05 PROCEDURE — 92286 ANT SGM IMG I&R SPECLR MIC: CPT | Performed by: OPHTHALMOLOGY

## 2024-03-05 PROCEDURE — 92134 CPTRZ OPH DX IMG PST SGM RTA: CPT | Performed by: OPHTHALMOLOGY

## 2024-03-05 ASSESSMENT — REFRACTION_CURRENTRX
OD_VPRISM_DIRECTION: SV
OD_AXIS: 055
OD_VPRISM_DIRECTION: SV
OD_AXIS: 090
OS_CYLINDER: -0.75
OS_AXIS: 073
OS_AXIS: 081
OS_SPHERE: +4.25
OD_OVR_VA: 20/
OD_CYLINDER: -0.25
OD_OVR_VA: 20/
OS_SPHERE: +2.25
OD_SPHERE: +0.75
OS_OVR_VA: 20/
OS_VPRISM_DIRECTION: SV
OS_VPRISM_DIRECTION: SV
OD_SPHERE: +2.50
OD_CYLINDER: -0.25
OS_OVR_VA: 20/
OS_CYLINDER: -0.75

## 2024-03-05 ASSESSMENT — REFRACTION_MANIFEST
OD_AXIS: 100
OD_CYLINDER: -1.00
OS_VA1: 20/30-
OS_AXIS: 086
OD_VA1: 20/50-2
OS_CYLINDER: -0.75
OD_SPHERE: PLANO
OS_SPHERE: +1.50

## 2024-03-05 ASSESSMENT — SPHEQUIV_DERIVED: OS_SPHEQUIV: 1.125

## 2024-03-23 ENCOUNTER — OFFICE (OUTPATIENT)
Dept: URBAN - METROPOLITAN AREA CLINIC 6 | Facility: CLINIC | Age: 64
Setting detail: OPHTHALMOLOGY
End: 2024-03-23
Payer: MEDICARE

## 2024-03-23 DIAGNOSIS — H18.513: ICD-10-CM

## 2024-03-23 PROBLEM — H35.373 EPIRETINAL MEMBRANE; BOTH EYES: Status: ACTIVE | Noted: 2024-03-05

## 2024-03-23 PROBLEM — H52.7 REFRACTIVE ERROR ; BOTH EYES: Status: ACTIVE | Noted: 2024-03-05

## 2024-03-23 PROBLEM — H25.13 CATARACT; BOTH EYES: Status: ACTIVE | Noted: 2024-03-05

## 2024-03-23 PROBLEM — H40.013 GLAUCOMA SUSPECT, LOW RISK 1-2 FACTORS; BOTH EYES: Status: ACTIVE | Noted: 2024-03-05

## 2024-03-23 PROBLEM — H43.393 VITREOUS FLOATERS; BOTH EYES: Status: ACTIVE | Noted: 2024-03-05

## 2024-03-23 PROCEDURE — 76514 ECHO EXAM OF EYE THICKNESS: CPT | Performed by: OPHTHALMOLOGY

## 2024-03-23 PROCEDURE — 92014 COMPRE OPH EXAM EST PT 1/>: CPT | Performed by: OPHTHALMOLOGY

## 2024-03-23 ASSESSMENT — REFRACTION_CURRENTRX
OS_VPRISM_DIRECTION: SV
OD_AXIS: 090
OD_CYLINDER: -0.75
OS_AXIS: 069
OS_OVR_VA: 20/
OD_VPRISM_DIRECTION: SV
OS_CYLINDER: -0.25
OS_SPHERE: +0.75
OS_SPHERE: +4.25
OS_CYLINDER: -0.75
OD_OVR_VA: 20/
OD_SPHERE: +2.25
OD_SPHERE: +2.50
OD_CYLINDER: -0.25
OD_VPRISM_DIRECTION: SV
OS_AXIS: 073
OS_VPRISM_DIRECTION: SV
OD_AXIS: 082
OD_OVR_VA: 20/
OS_OVR_VA: 20/

## 2024-03-23 ASSESSMENT — REFRACTION_MANIFEST
OS_SPHERE: +1.75
OS_AXIS: 091
OD_AXIS: 092
OD_SPHERE: 0.00
OU_VA: 20/40-2
OD_CYLINDER: -1.00
OD_VA1: 20/50+1
OS_CYLINDER: -1.00
OS_VA1: 20/50+1

## 2024-03-23 ASSESSMENT — SPHEQUIV_DERIVED
OS_SPHEQUIV: 1.25
OD_SPHEQUIV: -0.5

## 2024-04-16 ENCOUNTER — OFFICE (OUTPATIENT)
Dept: URBAN - METROPOLITAN AREA CLINIC 12 | Facility: CLINIC | Age: 64
Setting detail: OPHTHALMOLOGY
End: 2024-04-16
Payer: MEDICARE

## 2024-04-16 DIAGNOSIS — H25.11: ICD-10-CM

## 2024-04-16 DIAGNOSIS — H18.513: ICD-10-CM

## 2024-04-16 DIAGNOSIS — H25.13: ICD-10-CM

## 2024-04-16 PROCEDURE — 99213 OFFICE O/P EST LOW 20 MIN: CPT | Performed by: OPHTHALMOLOGY

## 2024-04-16 PROCEDURE — 92136 OPHTHALMIC BIOMETRY: CPT | Mod: 26,RT | Performed by: OPHTHALMOLOGY

## 2024-04-16 PROCEDURE — 92136 OPHTHALMIC BIOMETRY: CPT | Mod: TC | Performed by: OPHTHALMOLOGY

## 2024-04-22 ENCOUNTER — ASC (OUTPATIENT)
Dept: URBAN - METROPOLITAN AREA SURGERY 8 | Facility: SURGERY | Age: 64
Setting detail: OPHTHALMOLOGY
End: 2024-04-22
Payer: MEDICARE

## 2024-04-22 DIAGNOSIS — H52.211: ICD-10-CM

## 2024-04-22 DIAGNOSIS — H25.11: ICD-10-CM

## 2024-04-22 PROCEDURE — 66984 XCAPSL CTRC RMVL W/O ECP: CPT | Mod: 54,RT | Performed by: OPHTHALMOLOGY

## 2024-04-22 PROCEDURE — A9270 NON-COVERED ITEM OR SERVICE: HCPCS | Mod: GY | Performed by: OPHTHALMOLOGY

## 2024-04-22 PROCEDURE — FEMTO FEMTOSECOND LASER: Mod: GY | Performed by: OPHTHALMOLOGY

## 2024-04-23 ENCOUNTER — OFFICE (OUTPATIENT)
Dept: URBAN - METROPOLITAN AREA CLINIC 12 | Facility: CLINIC | Age: 64
Setting detail: OPHTHALMOLOGY
End: 2024-04-23
Payer: MEDICARE

## 2024-04-23 ENCOUNTER — RX ONLY (RX ONLY)
Age: 64
End: 2024-04-23

## 2024-04-23 DIAGNOSIS — H25.12: ICD-10-CM

## 2024-04-23 PROBLEM — Z96.1 PSEUDOPHAKIA - 1 DAY P/O W/ IOL: Status: ACTIVE | Noted: 2024-04-23

## 2024-04-23 PROCEDURE — 92136 OPHTHALMIC BIOMETRY: CPT | Mod: 26,LT | Performed by: OPHTHALMOLOGY

## 2024-05-06 ENCOUNTER — ASC (OUTPATIENT)
Dept: URBAN - METROPOLITAN AREA SURGERY 8 | Facility: SURGERY | Age: 64
Setting detail: OPHTHALMOLOGY
End: 2024-05-06
Payer: MEDICARE

## 2024-05-06 DIAGNOSIS — H25.12: ICD-10-CM

## 2024-05-06 DIAGNOSIS — H52.212: ICD-10-CM

## 2024-05-06 PROCEDURE — 66984 XCAPSL CTRC RMVL W/O ECP: CPT | Mod: 54,79,LT | Performed by: OPHTHALMOLOGY

## 2024-05-06 PROCEDURE — A9270 NON-COVERED ITEM OR SERVICE: HCPCS | Mod: GY | Performed by: OPHTHALMOLOGY

## 2024-05-06 PROCEDURE — FEMTO PRECISION LASER CATARACT SURGERY: Mod: GY | Performed by: OPHTHALMOLOGY

## 2024-05-07 ENCOUNTER — OFFICE (OUTPATIENT)
Dept: URBAN - METROPOLITAN AREA CLINIC 12 | Facility: CLINIC | Age: 64
Setting detail: OPHTHALMOLOGY
End: 2024-05-07
Payer: MEDICARE

## 2024-05-07 DIAGNOSIS — Z96.1: ICD-10-CM

## 2024-05-07 PROCEDURE — 99024 POSTOP FOLLOW-UP VISIT: CPT | Performed by: OPHTHALMOLOGY

## 2024-05-07 ASSESSMENT — CONFRONTATIONAL VISUAL FIELD TEST (CVF)
OS_FINDINGS: FULL
OD_FINDINGS: FULL

## 2024-06-15 ENCOUNTER — EMERGENCY (EMERGENCY)
Facility: HOSPITAL | Age: 64
LOS: 0 days | Discharge: HOME | End: 2024-06-15
Attending: EMERGENCY MEDICINE
Payer: MEDICARE

## 2024-06-15 VITALS
HEIGHT: 70 IN | DIASTOLIC BLOOD PRESSURE: 76 MMHG | HEART RATE: 95 BPM | RESPIRATION RATE: 20 BRPM | OXYGEN SATURATION: 100 % | TEMPERATURE: 98 F | SYSTOLIC BLOOD PRESSURE: 124 MMHG
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
## 2024-06-15 DIAGNOSIS — S61.012A LACERATION WITHOUT FOREIGN BODY OF LEFT THUMB WITHOUT DAMAGE TO NAIL, INITIAL ENCOUNTER: ICD-10-CM

## 2024-06-15 DIAGNOSIS — M79.645 PAIN IN LEFT FINGER(S): ICD-10-CM

## 2024-06-15 DIAGNOSIS — Z23 ENCOUNTER FOR IMMUNIZATION: ICD-10-CM

## 2024-06-15 DIAGNOSIS — Z98.890 OTHER SPECIFIED POSTPROCEDURAL STATES: Chronic | ICD-10-CM

## 2024-06-15 DIAGNOSIS — H26.9 UNSPECIFIED CATARACT: Chronic | ICD-10-CM
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
## 2024-06-15 PROCEDURE — 13120 CMPLX RPR S/A/L 1.1-2.5 CM: CPT

## 2024-06-15 PROCEDURE — 96375 TX/PRO/DX INJ NEW DRUG ADDON: CPT | Mod: XU

## 2024-06-15 PROCEDURE — 73130 X-RAY EXAM OF HAND: CPT | Mod: LT

## 2024-06-15 PROCEDURE — 96374 THER/PROPH/DIAG INJ IV PUSH: CPT | Mod: XU

## 2024-06-15 PROCEDURE — 90715 TDAP VACCINE 7 YRS/> IM: CPT

## 2024-06-15 PROCEDURE — 99285 EMERGENCY DEPT VISIT HI MDM: CPT | Mod: 25

## 2024-06-15 PROCEDURE — 73130 X-RAY EXAM OF HAND: CPT | Mod: 26,LT

## 2024-06-15 PROCEDURE — 99285 EMERGENCY DEPT VISIT HI MDM: CPT | Mod: FS

## 2024-06-15 RX ORDER — CEFAZOLIN SODIUM 1 G
2000 VIAL (EA) INJECTION ONCE
Refills: 0 | Status: COMPLETED | OUTPATIENT
Start: 2024-06-15 | End: 2024-06-15

## 2024-06-15 RX ORDER — CEPHALEXIN 500 MG
1 CAPSULE ORAL
Qty: 28 | Refills: 0
Start: 2024-06-15 | End: 2024-06-21

## 2024-06-15 RX ORDER — CEFAZOLIN SODIUM 1 G
2000 VIAL (EA) INJECTION ONCE
Refills: 0 | Status: DISCONTINUED | OUTPATIENT
Start: 2024-06-15 | End: 2024-06-15

## 2024-06-15 RX ORDER — TETANUS TOXOID, REDUCED DIPHTHERIA TOXOID AND ACELLULAR PERTUSSIS VACCINE, ADSORBED 5; 2.5; 8; 8; 2.5 [IU]/.5ML; [IU]/.5ML; UG/.5ML; UG/.5ML; UG/.5ML
0.5 SUSPENSION INTRAMUSCULAR ONCE
Refills: 0 | Status: COMPLETED | OUTPATIENT
Start: 2024-06-15 | End: 2024-06-15

## 2024-06-15 RX ORDER — MORPHINE SULFATE 50 MG/1
4 CAPSULE, EXTENDED RELEASE ORAL ONCE
Refills: 0 | Status: DISCONTINUED | OUTPATIENT
Start: 2024-06-15 | End: 2024-06-15

## 2024-06-15 RX ADMIN — TETANUS TOXOID, REDUCED DIPHTHERIA TOXOID AND ACELLULAR PERTUSSIS VACCINE, ADSORBED 0.5 MILLILITER(S): 5; 2.5; 8; 8; 2.5 SUSPENSION INTRAMUSCULAR at 13:45

## 2024-06-15 RX ADMIN — MORPHINE SULFATE 4 MILLIGRAM(S): 50 CAPSULE, EXTENDED RELEASE ORAL at 13:53

## 2024-06-15 RX ADMIN — Medication 2000 MILLIGRAM(S): at 13:39

## 2024-06-15 NOTE — ED STATDOCS - CARE PLAN
1 Principal Discharge DX:	Open fracture of thumb   Principal Discharge DX:	Open fracture of thumb  Secondary Diagnosis:	Laceration of left thumb

## 2024-06-15 NOTE — ED STATDOCS - ATTENDING APP SHARED VISIT CONTRIBUTION OF CARE
I, Darrick Hicks MD, personally saw the patient with ACP.  I have personally performed a face to face diagnostic evaluation on this patient.  I have reviewed the ACP note and agree with the history, exam, and plan of care, except as noted. I personally made/approved the management plan and take responsibility for the patient management   The initial assessment was performed by myself and then the patient was handed off to the ACP. The patient was followed and re-evaluated by the ACP. All labs, imaging and procedures were evaluated and performed by the ACP and I was available for consultation if any questions in the patients care came up.   I personally made/approved the management plan and take responsibility for the patient management.

## 2024-06-15 NOTE — ED ADULT NURSE NOTE - OBJECTIVE STATEMENT
Pt is a 64yr old male, A&OX4 and ambulatory, c/o L thumb injury s/p getting it stuck in between two metal rollers while working with machinery. Pt arrives with complicated laceration, unable to extend or flex. Pt states he still has sensation in the thumb. No active bleeding at this time. Denies other injury or trauma. IV access obtained, ABX given as per MD order. In NAD. Awaiting ortho.

## 2024-06-15 NOTE — ED STATDOCS - PROGRESS NOTE DETAILS
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  Last tetanus is not up-to-date.  Macerated laceration to the IP joint of the left thumb with decreased range of motion at the IP joint.  Possible bone or tendon exposure.  Will start IV antibiotics, update tetanus, x-ray of the left thumb and consult orthopedist. -Jorge Garza PA-C Dr hopkins at bedside evaluating pt. -Jorge Garza PA-C Dr Collado repaired injury. Recommends f/u in office monday/tuesday and keflex x 1 week. -Jorge Garza PA-C

## 2024-06-15 NOTE — ED STATDOCS - NS_ ATTENDINGSCRIBEDETAILS _ED_A_ED_FT
I, Darrick Hicks MD,  performed the initial face to face bedside interview with this patient regarding history of present illness, review of symptoms and relevant past medical, social and family history.  I completed an independent physical examination.  I was the initial provider who evaluated this patient.   I personally saw the patient and performed a substantive portion of the visit including all aspects of the medical decision making.  The history, relevant review of systems, past medical and surgical history, medical decision making, and physical examination was documented by the scribe in my presence and I attest to the accuracy of the documentation.

## 2024-06-15 NOTE — ED ADULT NURSE NOTE - NSFALLUNIVINTERV_ED_ALL_ED
Bed/Stretcher in lowest position, wheels locked, appropriate side rails in place/Call bell, personal items and telephone in reach/Instruct patient to call for assistance before getting out of bed/chair/stretcher/Non-slip footwear applied when patient is off stretcher/Williams to call system/Physically safe environment - no spills, clutter or unnecessary equipment/Purposeful proactive rounding/Room/bathroom lighting operational, light cord in reach

## 2024-06-15 NOTE — ED ADULT TRIAGE NOTE - CHIEF COMPLAINT QUOTE
Pt presents to the ED c/o left thumb pain. Pt reports getting his finger caught in machinery PTA. Trauma noted. Tefla and kerlex applied in triage. Dr. Hicks notified. Unknown last tetanus.

## 2024-06-15 NOTE — ED STATDOCS - PATIENT PORTAL LINK FT
You can access the FollowMyHealth Patient Portal offered by [facility] by registering at the following website: [email or web address]. By joining [employer]’s FollowMyHealth portal, you will also be able to view your health information using other applications (apps) compatible with our system.

## 2024-06-15 NOTE — ED STATDOCS - CLINICAL SUMMARY MEDICAL DECISION MAKING FREE TEXT BOX
64-year-old male presents the emergency department with left thumb pain.  Patient states that his thumb got stuck between 2 metal rollers.  Exam with complex laceration to the left thumb unable to flex and extend.  Will treat for likely open fracture give Ancef pain control x-ray hand consults

## 2024-06-15 NOTE — ED STATDOCS - NSICDXPASTSURGICALHX_GEN_ALL_CORE_FT
PAST SURGICAL HISTORY:  Cataract     History of incision and drainage     History of intestinal surgery as a child due to Hirshsprung's    History of shoulder surgery right tendon repair 2011

## 2024-06-15 NOTE — ED STATDOCS - NSFOLLOWUPINSTRUCTIONS_ED_ALL_ED_FT
Follow up with Dr Collado on Monday and Tuesday.   Take motrin/tylenol for pain.  Take the antibiotics as directed.  Return to the Emergency Department for worsening or persistent symptoms, and/or ANY NEW OR CONCERNING SYMPTOMS. If you have issues obtaining follow up, please call: [phone] or [phone]  to obtain a doctor or specialist who takes your insurance in your area.        Thumb Fracture  The hand, showing the distal phalanx and the proximal phalanx bones.  A thumb fracture is a break in one of the two bones in your thumb.    The bone that goes from the tip of your thumb to the first joint in your thumb is called the distal phalanx. The bone that goes from the first joint to the joint at the base of your thumb is called the proximal phalanx.    A broken thumb is more serious than a break in one of your other fingers because you need your thumb for grasping. Thumb fractures are also more likely to lead to pain and stiffness years after healing, called arthritis.    What are the causes?  A hard hit to your thumb.  Your thumb being pulled out of place.  What increases the risk?  Participating in sports such as wrestling, hockey, football, or skiing.  Having a condition that causes your bones to become thin and brittle.  What are the signs or symptoms?  Sudden, very bad pain.  Swelling.  Bruises on your thumb.  Not being able to move your thumb.  Your thumb being a shape that's different than normal.  Numbness or coldness.  A red, black, or blue thumbnail.  How is this diagnosed?  Your symptoms and medical history.  A physical exam.  X-ray, ultrasound, or MRI.  How is this treated?  Treatment for this condition depends on how bad your fracture is.  At first, you may need to wear a padded splint until you can get a cast or have surgery. The padded splint protects your thumb and keeps it from moving.  If the broken pieces of your bone line up with each other, you will need to wear a splint or cast for up to 4–6 weeks.  If your fracture is bad, your health care provider will need to align the bone pieces. Your provider may:  Move the bones back into position without surgery. This is called a closed reduction.  Do surgery to align the fracture and put in metal screws, plates, or wires to hold the bone pieces in place. This is called open reduction and internal fixation, or ORIF.  Do surgery to align the fracture and put in pins that are attached to a stabilizing bar outside your skin to hold the bone pieces in place. This is called external fixation.  Other treatment includes:  Wearing a splint or cast for up to 6 weeks.  Follow-up visits with your provider and X-rays to make sure the fracture is healing.  Doing physical therapy exercises to improve movement and strength in your thumb after your cast is taken off.  Follow these instructions at home:  If you have a removable splint:    Wear the splint as told by your provider. Remove it only as told by your provider.  Check the skin around the splint every day. Tell your provider about any concerns.  Loosen the splint if your thumb or fingers tingle, become numb, or turn cold and blue.  Keep the splint clean and dry.  If you have a nonremovable cast:    Do not put pressure on any part of the cast until it's fully hardened. This may take several hours.  Do not stick anything inside the cast to scratch your skin. Doing that increases your risk of infection.  Check the skin around the cast every day. Tell your provider about any concerns.  You may put lotion on dry skin around the edges of the cast. Do not put lotion on the skin underneath the cast.  Keep the cast clean and dry.  Bathing    Do not take baths, swim, or use a hot tub until your provider approves. Ask your provider if you may take showers.  If the splint or cast is not waterproof:  Do not let it get wet.  Cover it with a watertight covering when you take a bath or shower.  Managing pain, stiffness, and swelling    Bag of ice on a towel on the skin.  If told, put ice on the injured area.  If you have a removable splint, remove it as told by your provider.  Put ice in a plastic bag.  Place a towel between your skin and the bag, or between your cast and the bag.  Leave the ice on for 20 minutes, 2–3 times a day.  If your skin turns bright red, remove the ice right away to prevent skin damage. The risk of damage is higher if you can't feel pain, heat, or cold.  Move your thumb and fingers often to reduce stiffness and swelling.  Raise your hand above the level of your heart while you are sitting or lying down.  Activity    Return to your normal activities as told by your provider. Ask your provider what activities are safe for you.  After your cast is taken off, do exercises as told by your provider. Your provider may tell you to:  Move your thumb in circles.  Touch your thumb to your pinkie finger.  Do these exercises several times a day.  Ask your provider if you should use a hand exerciser to strengthen your muscles.  If your thumb feels stiff while you are exercising it, try doing the exercises while soaking your hand in warm water.  General instructions    Take over-the-counter and prescription medicines only as told by your provider.  Ask your provider when it's safe to drive if you have a splint or cast on your thumb.  Do not use any products that contain nicotine or tobacco. These products include cigarettes, chewing tobacco, and vaping devices, such as e-cigarettes. If you need help quitting, ask your provider.  Keep all follow-up visits. Your provider will need to check how your thumb is healing.  Contact a health care provider if:  You have pain that gets worse.  You have a fever.  You have a bad smell coming from your cast or splint.  Get help right away if:  Your thumb feels numb, tingles, turns cold, or turns blue.  You have redness or swelling that gets worse.  You have severe pain.

## 2024-06-15 NOTE — ED STATDOCS - CARE PROVIDER_API CALL
Salas Collado.  Orthopaedic Surgery  [address] [place] [address]  Phone: [phone]  Fax: [phone]  Follow Up Time:

## 2024-06-15 NOTE — ED STATDOCS - PHYSICAL EXAMINATION
Constitutional: NAD AAOx3  Eyes: PERRLA EOMI  Head: Normocephalic atraumatic  Mouth: MMM  Cardiac: regular rate   Resp: normal respiratory rate  Neuro: moving all extremities  Skin: No rashes  Msk: L thumb laceration, unable to flex or extend Constitutional: mild distress AAOx3  Eyes: PERRLA EOMI  Head: Normocephalic atraumatic  Mouth: MMM  Cardiac: regular rate   Resp: normal respiratory rate  Neuro: moving all extremities  Msk: L thumb laceration, unable to flex or extend

## 2024-07-11 ENCOUNTER — APPOINTMENT (OUTPATIENT)
Dept: ELECTROPHYSIOLOGY | Facility: CLINIC | Age: 64
End: 2024-07-11

## 2025-07-09 NOTE — H&P PST ADULT - PATIENT ON (OXYGEN DELIVERY METHOD)
Virtual Visit Details    Type of service:  Telephone Visit   Phone call duration: 20 minutes   Originating Location (pt. Location): Home    Distant Location (provider location):  Off-site  Telephone visit completed due to the patient did not have access to video, while the distant provider did.   room air